# Patient Record
Sex: MALE | Race: WHITE | Employment: OTHER | URBAN - METROPOLITAN AREA
[De-identification: names, ages, dates, MRNs, and addresses within clinical notes are randomized per-mention and may not be internally consistent; named-entity substitution may affect disease eponyms.]

---

## 2017-01-23 ENCOUNTER — OFFICE VISIT (OUTPATIENT)
Dept: ORTHOPEDIC SURGERY | Age: 72
End: 2017-01-23

## 2017-01-23 VITALS
DIASTOLIC BLOOD PRESSURE: 81 MMHG | HEART RATE: 78 BPM | HEIGHT: 70 IN | BODY MASS INDEX: 29.35 KG/M2 | TEMPERATURE: 96 F | SYSTOLIC BLOOD PRESSURE: 133 MMHG | WEIGHT: 205 LBS

## 2017-01-23 DIAGNOSIS — M65.341 TRIGGER RING FINGER OF RIGHT HAND: Primary | ICD-10-CM

## 2017-01-23 RX ORDER — LIDOCAINE HYDROCHLORIDE 10 MG/ML
0.25 INJECTION INFILTRATION; PERINEURAL ONCE
Qty: 0.5 ML | Refills: 0
Start: 2017-01-23 | End: 2017-01-23

## 2017-01-23 RX ORDER — BETAMETHASONE SODIUM PHOSPHATE AND BETAMETHASONE ACETATE 3; 3 MG/ML; MG/ML
4.5 INJECTION, SUSPENSION INTRA-ARTICULAR; INTRALESIONAL; INTRAMUSCULAR; SOFT TISSUE ONCE
Qty: 1 VIAL | Refills: 0
Start: 2017-01-23 | End: 2017-01-23

## 2017-01-23 NOTE — PROGRESS NOTES
Patient: Albino Blankenship                MRN: [id number]       SSN: [id number]  YOB: 1945        AGE: 66 y.o. SEX: male  Body mass index is 29.41 kg/(m^2). PCP: Ko Barrera MD  01/23/17      Chief Complaint   Patient presents with    Hand Pain     RIGHT RING FINGER       HISTORY OF PRESENT ILLNESS: Albino Blankenship is a 66 y.o. male who presents to the office for right hand pain. Has noticed his finger catching recently. He exercises on a regular basis and contributes his symptoms to this. He is requesting an injection for his finger since they have worked in the past.      Review of Systems   Constitutional: Negative. HENT: Negative. Eyes: Negative. Respiratory: Negative. Cardiovascular: Negative. Gastrointestinal: Negative. Genitourinary: Negative. Musculoskeletal: Positive for myalgias (right hand). Skin: Negative. Neurological: Negative. Endo/Heme/Allergies: Negative. Psychiatric/Behavioral: Negative. Social History     Social History    Marital status:      Spouse name: N/A    Number of children: N/A    Years of education: N/A     Occupational History    Not on file. Social History Main Topics    Smoking status: Former Smoker    Smokeless tobacco: Not on file    Alcohol use No    Drug use: No    Sexual activity: Not on file     Other Topics Concern    Not on file     Social History Narrative        Past Medical History   Diagnosis Date    Arthritis     Cancer ([facility] [address]      lung    Chronic obstructive pulmonary disease (HCC)         No Known Allergies      Current Outpatient Prescriptions   Medication Sig    umeclidinium-vilanterol (ANORO ELLIPTA) 62.5-25 mcg/actuation inhaler 62.5 mcg.  simvastatin (ZOCOR) 20 mg tablet Take 20 mg by mouth nightly.  oxyCODONE-acetaminophen (PERCOCET) 5-325 mg per tablet Take 1-2 Tabs by mouth every four (4) hours as needed for Pain.     aspirin (ASPIRIN) 325 mg tablet Take 1 Tab by mouth two (2) times daily (after meals).  budesonide (PULMICORT FLEXHALER) 90 mcg/actuation aepb inhaler Take 1 Puff by inhalation two (2) times a day.  tiotropium (SPIRIVA WITH HANDIHALER) 18 mcg inhalation capsule Take 1 Cap by inhalation daily.  testosterone (ANDROGEL) 1 % (25 mg/2.5 g) glpk 25 mg by TransDERmal route daily. No current facility-administered medications for this visit. Physical Exam  Constitutional: he is oriented to person, place, and time and well-developed, well-nourished, and in no distress. No distress. HENT:   Head: Normocephalic and atraumatic. Right Ear: Hearing normal.   Left Ear: Hearing normal.   Nose: Nose normal.   Eyes: Conjunctivae, EOM and lids are normal. Pupils are equal, round, and reactive to light. Neck: Trachea normal.   Pulmonary/Chest: Effort normal and breath sounds normal. No respiratory distress. Abdominal: Soft. Neurological: he is alert and oriented to person, place, and time. Skin: Skin is warm, dry and intact. he is not diaphoretic. Psychiatric: Affect normal.   Nursing note and vitals reviewed. Ortho Exam  Right hand: TTP over the A1 pulley of thr ring finger. The pulling could be felt at the A1 pulley. Procedure: After timeout and under sterile conditions, patient's right ring finger was injected with . 25 cc of Xylocaine and .75 cc of Celestone.     [facility]  OFFICE PROCEDURE PROGRESS NOTE        Chart reviewed for the following:   Aniceto Altamirano MD, have reviewed the History, Physical and updated the Allergic reactions for [address] performed immediately prior to start of procedure:   Aniceto Altamirano MD, have performed the following reviews on [facility] prior to the start of the procedure:            * Patient was identified by name and date of birth   * Agreement on procedure being performed was verified  * Risks and Benefits explained to the patient  * Procedure site verified and marked as necessary  * Patient was positioned for comfort  * Consent was signed and verified     Time: 10:17 AM        Date of procedure: 1/23/2017    Procedure performed by: Holly Thakkar MD    Provider assisted by: None     Patient assisted by: self    How tolerated by patient: tolerated the procedure well with no complications    Comments: none    RADIOGRAPHS: No x-rays were taken. IMPRESSION & PLAN: I feel his right hand pain is due to his ring finger triggering. With the pt's permission, I injected his ringht ring finger with cortisone today. He was fitted with a night splint I will see him back prn.       Scribed by Dennie Rising ([address]) as dictated by Ben Arechiga MD.

## 2017-04-17 ENCOUNTER — OFFICE VISIT (OUTPATIENT)
Dept: ORTHOPEDIC SURGERY | Age: 72
End: 2017-04-17

## 2017-04-17 VITALS
HEART RATE: 75 BPM | WEIGHT: 208 LBS | HEIGHT: 70 IN | RESPIRATION RATE: 15 BRPM | SYSTOLIC BLOOD PRESSURE: 127 MMHG | TEMPERATURE: 97.7 F | DIASTOLIC BLOOD PRESSURE: 84 MMHG | BODY MASS INDEX: 29.78 KG/M2

## 2017-04-17 DIAGNOSIS — M65.332 TRIGGER MIDDLE FINGER OF LEFT HAND: Primary | ICD-10-CM

## 2017-04-17 RX ORDER — LIDOCAINE HYDROCHLORIDE 10 MG/ML
0.25 INJECTION INFILTRATION; PERINEURAL ONCE
Qty: 0.5 ML | Refills: 0
Start: 2017-04-17 | End: 2017-04-17

## 2017-04-17 RX ORDER — BETAMETHASONE SODIUM PHOSPHATE AND BETAMETHASONE ACETATE 3; 3 MG/ML; MG/ML
4.5 INJECTION, SUSPENSION INTRA-ARTICULAR; INTRALESIONAL; INTRAMUSCULAR; SOFT TISSUE ONCE
Qty: 1 VIAL | Refills: 0
Start: 2017-04-17 | End: 2017-04-17

## 2017-04-17 NOTE — PROGRESS NOTES
Patient: Surekha Weiss                MRN: [id number]       SSN: [id number]  YOB: 1945        AGE: 66 y.o. SEX: male  There is no height or weight on file to calculate BMI. PCP: Jakob Ray MD  04/17/17      No chief complaint on file. HISTORY OF PRESENT ILLNESS: Surekha Weiss is a 66 y.o. male who presents to the office for locking of the left long finger. He has had trouble with this on the right hand is requesting a cortisone injection of the left long finger. Review of Systems   Constitutional: Negative. HENT: Negative. Eyes: Negative. Respiratory: Negative. Cardiovascular: Negative. Gastrointestinal: Negative. Genitourinary: Negative. Musculoskeletal: Positive for myalgias (left hand). Skin: Negative. Neurological: Negative. Endo/Heme/Allergies: Negative. Psychiatric/Behavioral: Negative. Social History     Social History    Marital status:      Spouse name: N/A    Number of children: N/A    Years of education: N/A     Occupational History    Not on file. Social History Main Topics    Smoking status: Former Smoker    Smokeless tobacco: Not on file    Alcohol use No    Drug use: No    Sexual activity: Not on file     Other Topics Concern    Not on file     Social History Narrative        Past Medical History:   Diagnosis Date    Arthritis     Cancer ([facility] [address]     lung    Chronic obstructive pulmonary disease (HCC)         No Known Allergies      Current Outpatient Prescriptions   Medication Sig    umeclidinium-vilanterol (ANORO ELLIPTA) 62.5-25 mcg/actuation inhaler 62.5 mcg.  oxyCODONE-acetaminophen (PERCOCET) 5-325 mg per tablet Take 1-2 Tabs by mouth every four (4) hours as needed for Pain.  aspirin (ASPIRIN) 325 mg tablet Take 1 Tab by mouth two (2) times daily (after meals).  budesonide (PULMICORT FLEXHALER) 90 mcg/actuation aepb inhaler Take 1 Puff by inhalation two (2) times a day.     tiotropium (SPIRIVA WITH HANDIHALER) 18 mcg inhalation capsule Take 1 Cap by inhalation daily.  simvastatin (ZOCOR) 20 mg tablet Take 20 mg by mouth nightly.  testosterone (ANDROGEL) 1 % (25 mg/2.5 g) glpk 25 mg by TransDERmal route daily. No current facility-administered medications for this visit. Physical Exam  Constitutional: he is oriented to person, place, and time and well-developed, well-nourished, and in no distress. No distress. HENT:   Head: Normocephalic and atraumatic. Right Ear: Hearing normal.   Left Ear: Hearing normal.   Nose: Nose normal.   Eyes: Conjunctivae, EOM and lids are normal. Pupils are equal, round, and reactive to light. Neck: Trachea normal.   Pulmonary/Chest: Effort normal and breath sounds normal. No respiratory distress. Abdominal: Soft. Neurological: he is alert and oriented to person, place, and time. Skin: Skin is warm, dry and intact. he is not diaphoretic. Psychiatric: Affect normal.   Nursing note and vitals reviewed. Ortho Exam   Shows tenderness at the A1 pulley of the left long finger and locking at the PIP joint. Procedure: After timeout and under sterile conditions, patient's left middle finger was injected with 0.25 cc of Xylocaine and 0.75 cc of Celestone.     [address] [facility]  OFFICE PROCEDURE PROGRESS NOTE        Chart reviewed for the following:   Ruel Mattson MD, have reviewed the History, Physical and updated the Allergic reactions for [address] performed immediately prior to start of procedure:   Ruel Mattson MD, have performed the following reviews on [facility] prior to the start of the procedure:            * Patient was identified by name and date of birth   * Agreement on procedure being performed was verified  * Risks and Benefits explained to the patient  * Procedure site verified and marked as necessary  * Patient was positioned for comfort  * Consent was signed and verified     Time: 7:58 AM        Date of procedure: 4/17/2017    Procedure performed by: Darcy Gaucher, MD    Provider assisted by: None     Patient assisted by: self    How tolerated by patient: tolerated the procedure well with no complications    Comments: none      RADIOGRAPHS:  No new XR's taken today. IMPRESSION & PLAN: Trigger finger left long finger. Injection and night splint of the finger. Padding to prevent recurrence. I will see him back prn. Written by Adria De La Vega, as dictated by Dr. Hailey Castillo, Dr. Darcy Gaucher, confirm that all documentation is accurate.

## 2017-05-15 RX ORDER — BISMUTH SUBSALICYLATE 262 MG
1 TABLET,CHEWABLE ORAL DAILY
COMMUNITY

## 2017-05-25 ENCOUNTER — ANESTHESIA EVENT (OUTPATIENT)
Dept: ENDOSCOPY | Age: 72
End: 2017-05-25
Payer: MEDICARE

## 2017-05-26 ENCOUNTER — HOSPITAL ENCOUNTER (OUTPATIENT)
Age: 72
Setting detail: OUTPATIENT SURGERY
Discharge: HOME | End: 2017-05-26
Attending: INTERNAL MEDICINE | Admitting: INTERNAL MEDICINE
Payer: MEDICARE

## 2017-05-26 ENCOUNTER — ANESTHESIA (OUTPATIENT)
Dept: ENDOSCOPY | Age: 72
End: 2017-05-26
Payer: MEDICARE

## 2017-05-26 VITALS
SYSTOLIC BLOOD PRESSURE: 117 MMHG | RESPIRATION RATE: 16 BRPM | HEIGHT: 70 IN | BODY MASS INDEX: 29.2 KG/M2 | DIASTOLIC BLOOD PRESSURE: 73 MMHG | TEMPERATURE: 97.5 F | OXYGEN SATURATION: 98 % | WEIGHT: 204 LBS | HEART RATE: 74 BPM

## 2017-05-26 PROCEDURE — 76060000031 HC ANESTHESIA FIRST 0.5 HR: Performed by: INTERNAL MEDICINE

## 2017-05-26 PROCEDURE — 74011000250 HC RX REV CODE- 250

## 2017-05-26 PROCEDURE — 74011250636 HC RX REV CODE- 250/636

## 2017-05-26 PROCEDURE — 88305 TISSUE EXAM BY PATHOLOGIST: CPT | Performed by: INTERNAL MEDICINE

## 2017-05-26 PROCEDURE — 76040000019: Performed by: INTERNAL MEDICINE

## 2017-05-26 PROCEDURE — 74011000250 HC RX REV CODE- 250: Performed by: NURSE ANESTHETIST, CERTIFIED REGISTERED

## 2017-05-26 PROCEDURE — 74011250636 HC RX REV CODE- 250/636: Performed by: NURSE ANESTHETIST, CERTIFIED REGISTERED

## 2017-05-26 PROCEDURE — 77030013992 HC SNR POLYP ENDOSC BSC -B: Performed by: INTERNAL MEDICINE

## 2017-05-26 RX ORDER — FAMOTIDINE 10 MG/ML
20 INJECTION INTRAVENOUS ONCE
Status: COMPLETED | OUTPATIENT
Start: 2017-05-26 | End: 2017-05-26

## 2017-05-26 RX ORDER — INSULIN LISPRO 100 [IU]/ML
INJECTION, SOLUTION INTRAVENOUS; SUBCUTANEOUS ONCE
Status: DISCONTINUED | OUTPATIENT
Start: 2017-05-26 | End: 2017-05-26 | Stop reason: HOSPADM

## 2017-05-26 RX ORDER — ATROPINE SULFATE 0.1 MG/ML
0.5 INJECTION INTRAVENOUS
Status: CANCELLED | OUTPATIENT
Start: 2017-05-26 | End: 2017-05-26

## 2017-05-26 RX ORDER — NALOXONE HYDROCHLORIDE 0.4 MG/ML
0.4 INJECTION, SOLUTION INTRAMUSCULAR; INTRAVENOUS; SUBCUTANEOUS
Status: CANCELLED | OUTPATIENT
Start: 2017-05-26 | End: 2017-05-26

## 2017-05-26 RX ORDER — DEXTROMETHORPHAN/PSEUDOEPHED 2.5-7.5/.8
1.2 DROPS ORAL
Status: CANCELLED | OUTPATIENT
Start: 2017-05-26

## 2017-05-26 RX ORDER — SODIUM CHLORIDE 0.9 % (FLUSH) 0.9 %
5-10 SYRINGE (ML) INJECTION EVERY 8 HOURS
Status: CANCELLED | OUTPATIENT
Start: 2017-05-26 | End: 2017-05-26

## 2017-05-26 RX ORDER — FLUMAZENIL 0.1 MG/ML
0.2 INJECTION INTRAVENOUS
Status: CANCELLED | OUTPATIENT
Start: 2017-05-26 | End: 2017-05-26

## 2017-05-26 RX ORDER — EPINEPHRINE 0.1 MG/ML
1 INJECTION INTRACARDIAC; INTRAVENOUS
Status: CANCELLED | OUTPATIENT
Start: 2017-05-26 | End: 2017-05-26

## 2017-05-26 RX ORDER — LIDOCAINE HYDROCHLORIDE 20 MG/ML
INJECTION, SOLUTION EPIDURAL; INFILTRATION; INTRACAUDAL; PERINEURAL AS NEEDED
Status: DISCONTINUED | OUTPATIENT
Start: 2017-05-26 | End: 2017-05-26 | Stop reason: HOSPADM

## 2017-05-26 RX ORDER — LIDOCAINE HYDROCHLORIDE 10 MG/ML
0.1 INJECTION, SOLUTION EPIDURAL; INFILTRATION; INTRACAUDAL; PERINEURAL AS NEEDED
Status: DISCONTINUED | OUTPATIENT
Start: 2017-05-26 | End: 2017-05-26 | Stop reason: HOSPADM

## 2017-05-26 RX ORDER — SODIUM CHLORIDE 0.9 % (FLUSH) 0.9 %
5-10 SYRINGE (ML) INJECTION EVERY 8 HOURS
Status: DISCONTINUED | OUTPATIENT
Start: 2017-05-26 | End: 2017-05-26 | Stop reason: HOSPADM

## 2017-05-26 RX ORDER — SODIUM CHLORIDE 0.9 % (FLUSH) 0.9 %
5-10 SYRINGE (ML) INJECTION AS NEEDED
Status: CANCELLED | OUTPATIENT
Start: 2017-05-26 | End: 2017-05-26

## 2017-05-26 RX ORDER — SODIUM CHLORIDE, SODIUM LACTATE, POTASSIUM CHLORIDE, CALCIUM CHLORIDE 600; 310; 30; 20 MG/100ML; MG/100ML; MG/100ML; MG/100ML
75 INJECTION, SOLUTION INTRAVENOUS CONTINUOUS
Status: DISCONTINUED | OUTPATIENT
Start: 2017-05-26 | End: 2017-05-26 | Stop reason: HOSPADM

## 2017-05-26 RX ORDER — PROPOFOL 10 MG/ML
INJECTION, EMULSION INTRAVENOUS AS NEEDED
Status: DISCONTINUED | OUTPATIENT
Start: 2017-05-26 | End: 2017-05-26 | Stop reason: HOSPADM

## 2017-05-26 RX ORDER — AMOXICILLIN 500 MG/1
500 TABLET, FILM COATED ORAL
COMMUNITY
End: 2018-07-30

## 2017-05-26 RX ORDER — SODIUM CHLORIDE 0.9 % (FLUSH) 0.9 %
5-10 SYRINGE (ML) INJECTION AS NEEDED
Status: DISCONTINUED | OUTPATIENT
Start: 2017-05-26 | End: 2017-05-26 | Stop reason: HOSPADM

## 2017-05-26 RX ADMIN — LIDOCAINE HYDROCHLORIDE 60 MG: 20 INJECTION, SOLUTION EPIDURAL; INFILTRATION; INTRACAUDAL; PERINEURAL at 09:05

## 2017-05-26 RX ADMIN — PROPOFOL 50 MG: 10 INJECTION, EMULSION INTRAVENOUS at 09:05

## 2017-05-26 RX ADMIN — SODIUM CHLORIDE, SODIUM LACTATE, POTASSIUM CHLORIDE, AND CALCIUM CHLORIDE 75 ML/HR: 600; 310; 30; 20 INJECTION, SOLUTION INTRAVENOUS at 08:10

## 2017-05-26 RX ADMIN — PROPOFOL 50 MG: 10 INJECTION, EMULSION INTRAVENOUS at 09:10

## 2017-05-26 RX ADMIN — FAMOTIDINE 20 MG: 10 INJECTION, SOLUTION INTRAVENOUS at 08:10

## 2017-05-26 NOTE — DISCHARGE INSTRUCTIONS
Colon Polyps: Care Instructions  Your Care Instructions    Colon polyps are growths in the colon or the rectum. The cause of most colon polyps is not known, and most people who get them do not have any problems. But a certain kind can turn into cancer. For this reason, regular testing for colon polyps is important for people age 48 and older and anyone who has an increased risk for colon cancer. Polyps are usually found through routine colon cancer screening tests. Although most colon polyps are not cancerous, they are usually removed and then tested for cancer. Screening for colon cancer saves lives because the cancer can usually be cured if it is caught early. If you have a polyp that is the type that can turn into cancer, you may need more tests to examine your entire colon. The doctor will remove any other polyps that he or she finds, and you will be tested more often. Follow-up care is a key part of your treatment and safety. Be sure to make and go to all appointments, and call your doctor if you are having problems. It's also a good idea to know your test results and keep a list of the medicines you take. How can you care for yourself at home? Regular exams to look for colon polyps are the best way to prevent polyps from turning into colon cancer. These can include stool tests, sigmoidoscopy, colonoscopy, and CT colonography. Talk with your doctor about a testing schedule that is right for you. To prevent polyps  There is no home treatment that can prevent colon polyps. But these steps may help lower your risk for cancer. · Stay active. Being active can help you get to and stay at a healthy weight. Try to exercise on most days of the week. Walking is a good choice. · Eat well. Choose a variety of vegetables, fruits, legumes (such as peas and beans), fish, poultry, and whole grains. · Do not smoke. If you need help quitting, talk to your doctor about stop-smoking programs and medicines.  These can increase your chances of quitting for good. · If you drink alcohol, limit how much you drink. Limit alcohol to 2 drinks a day for men and 1 drink a day for women. When should you call for help? Call your doctor now or seek immediate medical care if:  · You have severe belly pain. · Your stools are maroon or very bloody. Watch closely for changes in your health, and be sure to contact your doctor if:  · You have a fever. · You have nausea or vomiting. · You have a change in bowel habits (new constipation or diarrhea). · Your symptoms get worse or are not improving as expected. Where can you learn more? Go to http://riya-caro.info/. Enter 95 [id number] in the search box to learn more about \"Colon Polyps: Care Instructions. \"  Current as of: August 24, 2016  Content Version: 11.2  © 9122-5546 [employer]. Care instructions adapted under license by [employer] (which disclaims liability or warranty for this information). If you have questions about a medical condition or this instruction, always ask your healthcare professional. [address] any warranty or liability for your use of this information. DISCHARGE SUMMARY from Nurse     POST-PROCEDURE NOTE:    Yasir Key diagnostic procedure was tolerated well. A copy of the study results will be sent to your Ordering Physician. Medications:    *Please give a list of your current medications to your Primary Care Provider. *Please update this list whenever your medications are discontinued, doses are  changed, or new medications (including over-the-counter products) are added. *Please carry medication information at all times in case of emergency situations. These are general instructions for a healthy lifestyle:    No smoking/ No tobacco products/ Avoid exposure to second hand smoke.  Surgeon General's Warning:  Quitting smoking now greatly reduces serious risk to your health.     Obesity, smoking, and a sedentary lifestyle greatly increase your risk for illness.  A healthy diet, regular physical exercise & weight monitoring are important for maintaining a healthy lifestyle   You may be retaining fluid if you have a history of heart failure or if you experience any of the following symptoms:  Weight gain of 3 pounds or more overnight or 5 pounds in a week, increased swelling in our hands or feet or shortness of breath while lying flat in bed. Please call your doctor as soon as you notice any of these symptoms; do not wait until your next office visit. Recognize signs and symptoms of STROKE:  F  -  Face looks uneven  A  -  Arms unable to move or move unevenly  S  -  Speech slurred or non-existent  T  -  Time to call 911 - as soon as signs and symptoms begin - DO NOT go back         to bed or wait to see If you get better - TIME IS BRAIN. Colorectal Screening   Colorectal cancer almost always develops from precancerous polyps (abnormal growths) in the colon or rectum. Screening tests can find precancerous polyps, so that they can be removed before they turn into cancer. Screening tests can also find colorectal cancer early, when treatment works best.  Myrna Hermosillo Speak with your physician about when you should begin screening and how often you should be tested. [employer] Activation    Thank you for requesting access to [employer]. Please follow the instructions below to securely access and download your online medical record. [employer] allows you to send messages to your doctor, view your test results, renew your prescriptions, schedule appointments, and more. How Do I Sign Up? 1. In your internet browser, go to [email or web address]. 2. Click on the First Time User? Click Here link in the Sign In box. You will see the New Member Sign Up page. 3. Enter your [employer] Access Code exactly as it appears below.  You will not need to use this code after youve completed the sign-up process. If you do not sign up before the expiration date, you must request a new code. [email or web address] Access Code: [email or web address]  Expires: 2017  8:28 AM (This is the date your [email or web address] access code will )    4. Enter the last four digits of your Social Security Number (xxxx) and Date of Birth (mm/dd/yyyy) as indicated and click Submit. You will be taken to the next sign-up page. 5. Create a [email or web address] ID. This will be your [email or web address] login ID and cannot be changed, so think of one that is secure and easy to remember. 6. Create a [email or web address] password. You can change your password at any time. 7. Enter your Password Reset Question and Answer. This can be used at a later time if you forget your password. 8. Enter your e-mail address. You will receive e-mail notification when new information is available in [address]. 9. Click Sign Up. You can now view and download portions of your medical record. 10. Click the Download Summary menu link to download a portable copy of your medical information. Additional Information    If you have questions, please call [phone]. Remember, [email or web address] is NOT to be used for urgent needs. For medical emergencies, dial 911. Educational references and/or instructions provided during this visit included:    Colon Polyps      A    Discharge information has been reviewed with the patient. The patient verbalized understanding. Colonoscopy: What to Expect at [address] [place] [address]  After you have a colonoscopy, you will stay at the clinic for 1 to 2 hours until the medicines wear off. Then you can go home. But you will need to arrange for a ride. Your doctor will tell you when you can eat and do your other usual activities. Your doctor will talk to you about when you will need your next colonoscopy. Your doctor can help you decide how often you need to be checked. This will depend on the results of your test and your risk for colorectal cancer.   After the test, you may be bloated or have gas pains. You may need to pass gas. If a biopsy was done or a polyp was removed, you may have streaks of blood in your stool (feces) for a few days. This care sheet gives you a general idea about how long it will take for you to recover. But each person recovers at a different pace. Follow the steps below to get better as quickly as possible. How can you care for yourself at home? Activity  · Rest when you feel tired. · You can do your normal activities when it feels okay to do so. Diet  · Follow your doctor's directions for eating. · Unless your doctor has told you not to, drink plenty of fluids. This helps to replace the fluids that were lost during the colon prep. · Do not drink alcohol. Medicines  · Your doctor will tell you if and when you can restart your medicines. He or she will also give you instructions about taking any new medicines. · If you take blood thinners, such as warfarin (Coumadin), clopidogrel (Plavix), or aspirin, be sure to talk to your doctor. He or she will tell you if and when to start taking those medicines again. Make sure that you understand exactly what your doctor wants you to do. · If polyps were removed or a biopsy was done during the test, your doctor may tell you not to take aspirin or other anti-inflammatory medicines for a few days. These include ibuprofen (Advil, Motrin) and naproxen (Aleve). Other instructions  · For your safety, do not drive or operate machinery until the medicine wears off and you can think clearly. Your doctor may tell you not to drive or operate machinery until the day after your test.  · Do not sign legal documents or make major decisions until the medicine wears off and you can think clearly. The anesthesia can make it hard for you to fully understand what you are agreeing to. Follow-up care is a key part of your treatment and safety.  Be sure to make and go to all appointments, and call your doctor if you are having problems. It's also a good idea to know your test results and keep a list of the medicines you take. When should you call for help? Call 911 anytime you think you may need emergency care. For example, call if:  · You passed out (lost consciousness). · You pass maroon or bloody stools. · You have severe belly pain. Call your doctor now or seek immediate medical care if:  · Your stools are black and tarlike. · Your stools have streaks of blood, but you did not have a biopsy or any polyps removed. · You have belly pain, or your belly is swollen and firm. · You vomit. · You have a fever. · You are very dizzy. Watch closely for changes in your health, and be sure to contact your doctor if you have any problems. Where can you learn more? Go to http://riya-caro.info/. Enter E264 in the search box to learn more about \"Colonoscopy: What to Expect at Home. \"  Current as of: August 9, 2016  Content Version: 11.2  © 2485-5956 [employer]. Care instructions adapted under license by [employer] (which disclaims liability or warranty for this information). If you have questions about a medical condition or this instruction, always ask your healthcare professional. [address] any warranty or liability for your use of this information.

## 2017-05-26 NOTE — PROCEDURES
Tiffanie  Two [facility], [address]      Brief Procedure Note    Abdirahman Agarwal  1945  [id number]    Date of Procedure: 5/26/2017    Preoperative diagnosis: V12.72 - Z86.010, Personal history of colonic polyps  496 - J44.9,  Mild chronic obstructive pulmonary disease  162.9 - C34.90,  Malignant tumor of lung    Postoperative diagnosis:  Rectal pollyp    Type of Anesthesia: MAC (monitered anesthesia care)    Description of Findings: same as post op dx    Procedure: Procedure(s):  COLONOSCOPY / POLYPECTOMY    :  Dr. Brandon Delgado MD    Assistant(s): [unfilled]    Type of Anesthesia:MAC     EBL:None    Specimens:   ID Type Source Tests Collected by Time Destination   1 : Rectal polyp Preservative Rectum  Brandon Delgado MD 5/26/2017 7240 Pathology       Findings: See printed and scanned procedure note    Complications: None    Dr. Brandon Delgado MD  5/26/2017  9:18 AM

## 2017-05-26 NOTE — PROCEDURES
Tiffanie  Two [facility], [address]      Brief Procedure Note    Venda Smoker  1945  [id number]    Date of Procedure: 5/26/2017    Preoperative diagnosis: V12.72 - Z86.010, Personal history of colonic polyps  496 - J44.9,  Mild chronic obstructive pulmonary disease  162.9 - C34.90,  Malignant tumor of lung    Postoperative diagnosis:  * normal colon exam. *    Type of Anesthesia: MAC (monitered anesthesia care)    Description of Findings: same as post op dx    Procedure: Procedure(s):  COLONOSCOPY    :  Dr. Leon Villaseñor MD    Assistant(s): [unfilled]    Type of Anesthesia:MAC     EBL:None    Specimens: * No specimens in log *    Findings: See printed and scanned procedure note    Complications: None    Dr. Leon Villaseñor MD  5/26/2017  8:52 AM

## 2017-05-26 NOTE — H&P
History and Physical reviewed; I have examined the patient and there are no pertinent changes. Darshana Moncada MD, MD   7:57 AM 5/26/2017  [facility], [address] [place] [address]  [email or web address]

## 2017-05-26 NOTE — ANESTHESIA POSTPROCEDURE EVALUATION
Post-Anesthesia Evaluation and Assessment    Patient: Darren Eng MRN: [id number]  SSN: [id number]    YOB: 1945  Age: 66 y.o. Sex: male       Cardiovascular Function/Vital Signs  Visit Vitals    BP (!) 80/50    Pulse 81    Temp 36.4 °C (97.5 °F)    Resp 20    Ht 5' 10\" (1.778 m)    Wt 92.5 kg (204 lb)    SpO2 96%    BMI 29.27 kg/m2       Patient is status post MAC anesthesia for Procedure(s):  COLONOSCOPY / POLYPECTOMY. Nausea/Vomiting: None    Postoperative hydration reviewed and adequate. Pain:  Pain Scale 1: Numeric (0 - 10) (05/26/17 6514)  Pain Intensity 1: 0 (05/26/17 0054)   Managed    Neurological Status: At baseline    Mental Status and Level of Consciousness: Arousable    Pulmonary Status:   O2 Device: Nasal cannula (05/26/17 0918)   Adequate oxygenation and airway patent    Complications related to anesthesia: None    Post-anesthesia assessment completed.  No concerns    Signed By: John Godinez MD     May 26, 2017

## 2017-05-26 NOTE — ANESTHESIA PREPROCEDURE EVALUATION
Anesthetic History   No history of anesthetic complications            Review of Systems / Medical History  Patient summary reviewed and pertinent labs reviewed    Pulmonary    COPD               Neuro/Psych   Within defined limits           Cardiovascular  Within defined limits                     GI/Hepatic/Renal  Within defined limits              Endo/Other        Arthritis     Other Findings   Comments:   Risk Factors for Postoperative nausea/vomiting:       History of postoperative nausea/vomiting? NO       Female? NO       Motion sickness? NO       Intended opioid administration for postoperative analgesia? NO      Smoking Abstinence  Current Smoker? NO  Elective Surgery? YES  Seen preoperatively by anesthesiologist or proxy prior to day of surgery? YES  Pt abstained from smoking 24 hours prior to anesthesia?  N/A           Physical Exam    Airway  Mallampati: II  TM Distance: 4 - 6 cm  Neck ROM: normal range of motion   Mouth opening: Normal     Cardiovascular  Regular rate and rhythm,  S1 and S2 normal,  no murmur, click, rub, or gallop             Dental  No notable dental hx       Pulmonary  Breath sounds clear to auscultation               Abdominal  Abdominal exam normal       Other Findings            Anesthetic Plan    ASA: 2  Anesthesia type: MAC          Induction: Intravenous  Anesthetic plan and risks discussed with: Patient

## 2017-05-26 NOTE — IP AVS SNAPSHOT
Beatriz Zambrano [address] [facility] [id number] [phone] Patient: Khoa Castano MRN: [id number] EXT:12/2/[birth year]3 You are allergic to the following No active allergies Recent Documentation Height Weight BMI Smoking Status 1.778 m 92.5 kg 29.27 kg/m2 Former Smoker Emergency Contacts Name Discharge Info Relation Home Work Mobile [address] CAREGIVER [3] Spouse [3] [phone] About your hospitalization You were admitted on:  May 26, 2017 You last received care in the:  [facility] You were discharged on:  May 26, 2017 Unit phone number:  [phone] Why you were hospitalized Your primary diagnosis was:  Not on File Providers Seen During Your Hospitalizations Provider Role Specialty Primary office phone Stevenson Luna MD Attending Provider Gastroenterology [phone] Your Primary Care Physician (PCP) Primary Care Physician Office Phone Office Fax Reyna Lyudmila [phone] Follow-up Information Follow up With Details Comments Contact Info MD Wolfgang Chan [phone] [facility] [address] 
[phone] Current Discharge Medication List  
  
CONTINUE these medications which have NOT CHANGED Dose & Instructions Dispensing Information Comments Morning Noon Evening Bedtime ADVIL PM PO Your last dose was: Your next dose is: Take  by mouth. Refills:  0  
     
   
   
   
  
 amoxicillin 500 mg Tab Your last dose was: Your next dose is:    
   
   
 Dose:  500 mg Take 500 mg by mouth once as needed (Prior to any procedure). Refills:  0 ANDROGEL 1 % (25 mg/2.5gram) Glpk Generic drug:  testosterone Your last dose was:     
   
Your next dose is:    
   
   
 Dose:  25 mg  
 25 mg by TransDERmal route daily. Refills:  0  
     
   
   
   
  
 ANORO ELLIPTA 62.5-25 mcg/actuation inhaler Generic drug:  umeclidinium-vilanterol Your last dose was: Your next dose is:    
   
   
 Dose:  62.5 mcg 62.5 mcg. Refills:  0  
     
   
   
   
  
 multivitamin tablet Commonly known as:  ONE A DAY Your last dose was: Your next dose is:    
   
   
 Dose:  1 Tab Take 1 Tab by mouth daily. Refills:  0  
     
   
   
   
  
 simvastatin 20 mg tablet Commonly known as:  ZOCOR Your last dose was: Your next dose is:    
   
   
 Dose:  20 mg Take 20 mg by mouth nightly. Refills:  0 Discharge Instructions Colon Polyps: Care Instructions Your Care Instructions Colon polyps are growths in the colon or the rectum. The cause of most colon polyps is not known, and most people who get them do not have any problems. But a certain kind can turn into cancer. For this reason, regular testing for colon polyps is important for people age 48 and older and anyone who has an increased risk for colon cancer. Polyps are usually found through routine colon cancer screening tests. Although most colon polyps are not cancerous, they are usually removed and then tested for cancer. Screening for colon cancer saves lives because the cancer can usually be cured if it is caught early. If you have a polyp that is the type that can turn into cancer, you may need more tests to examine your entire colon. The doctor will remove any other polyps that he or she finds, and you will be tested more often. Follow-up care is a key part of your treatment and safety. Be sure to make and go to all appointments, and call your doctor if you are having problems. It's also a good idea to know your test results and keep a list of the medicines you take. How can you care for yourself at home? Regular exams to look for colon polyps are the best way to prevent polyps from turning into colon cancer. These can include stool tests, sigmoidoscopy, colonoscopy, and CT colonography. Talk with your doctor about a testing schedule that is right for you. To prevent polyps There is no home treatment that can prevent colon polyps. But these steps may help lower your risk for cancer. · Stay active. Being active can help you get to and stay at a healthy weight. Try to exercise on most days of the week. Walking is a good choice. · Eat well. Choose a variety of vegetables, fruits, legumes (such as peas and beans), fish, poultry, and whole grains. · Do not smoke. If you need help quitting, talk to your doctor about stop-smoking programs and medicines. These can increase your chances of quitting for good. · If you drink alcohol, limit how much you drink. Limit alcohol to 2 drinks a day for men and 1 drink a day for women. When should you call for help? Call your doctor now or seek immediate medical care if: 
· You have severe belly pain. · Your stools are maroon or very bloody. Watch closely for changes in your health, and be sure to contact your doctor if: 
· You have a fever. · You have nausea or vomiting. · You have a change in bowel habits (new constipation or diarrhea). · Your symptoms get worse or are not improving as expected. Where can you learn more? Go to http://riya-caro.info/. Enter 95 [id number] in the search box to learn more about \"Colon Polyps: Care Instructions. \" Current as of: August 24, 2016 Content Version: 11.2 © 3354-1186 [employer]woon Edition[employer]. Care instructions adapted under license by [employer] (which disclaims liability or warranty for this information).  If you have questions about a medical condition or this instruction, always ask your healthcare professional. [address] any warranty or liability for your use of this information. DISCHARGE SUMMARY from Nurse POST-PROCEDURE NOTE: 
 
Todays diagnostic procedure was tolerated well. A copy of the study results will be sent to your Ordering Physician. Medications: *Please give a list of your current medications to your Primary Care Provider. *Please update this list whenever your medications are discontinued, doses are 
changed, or new medications (including over-the-counter products) are added. *Please carry medication information at all times in case of emergency situations. These are general instructions for a healthy lifestyle: No smoking/ No tobacco products/ Avoid exposure to second hand smoke. ? Surgeon General's Warning:  Quitting smoking now greatly reduces serious risk to your health. Obesity, smoking, and a sedentary lifestyle greatly increase your risk for illness. ? A healthy diet, regular physical exercise & weight monitoring are important for maintaining a healthy lifestyle ? You may be retaining fluid if you have a history of heart failure or if you experience any of the following symptoms:  Weight gain of 3 pounds or more overnight or 5 pounds in a week, increased swelling in our hands or feet or shortness of breath while lying flat in bed. Please call your doctor as soon as you notice any of these symptoms; do not wait until your next office visit. Recognize signs and symptoms of STROKE: 
F  -  Face looks uneven A  -  Arms unable to move or move unevenly S  -  Speech slurred or non-existent T  -  Time to call 911 - as soon as signs and symptoms begin - DO NOT go back         to bed or wait to see If you get better - TIME IS BRAIN. Colorectal Screening ? Colorectal cancer almost always develops from precancerous polyps (abnormal growths) in the colon or rectum. Screening tests can find precancerous polyps, so that they can be removed before they turn into cancer.  Screening tests can also find colorectal cancer early, when treatment works best. 
? Speak with your physician about when you should begin screening and how often you should be tested. MyChart Activation Thank you for requesting access to [employer]. Please follow the instructions below to securely access and download your online medical record. R[employer]ix allows you to send messages to your doctor, view your test results, renew your prescriptions, schedule appointments, and more. How Do I Sign Up? 1. In your internet browser, go to [email or web address]. 2. Click on the First Time User? Click Here link in the Sign In box. You will see the New Member Sign Up page. 3. Enter your Rormix Access Code exactly as it appears below. You will not need to use this code after youve completed the sign-up process. If you do not sign up before the expiration date, you must request a new code. R[employer]ix Access Code: [email or web address] Expires: 2017  8:28 AM (This is the date your [employer] access code will ) 4. Enter the last four digits of your Social Security Number (xxxx) and Date of Birth (mm/dd/yyyy) as indicated and click Submit. You will be taken to the next sign-up page. 5. Create a Rormix ID. This will be your R[employer]ix login ID and cannot be changed, so think of one that is secure and easy to remember. 6. Create a R[employer]ix password. You can change your password at any time. 7. Enter your Password Reset Question and Answer. This can be used at a later time if you forget your password. 8. Enter your e-mail address. You will receive e-mail notification when new information is available in [address] 9. Click Sign Up. You can now view and download portions of your medical record. 10. Click the Download Summary menu link to download a portable copy of your medical information. Additional Information If you have questions, please call [phone].  Remember, [address] is NOT to be used for urgent needs. For medical emergencies, dial 911. Educational references and/or instructions provided during this visit included: 
 
Colon Polyps A Discharge information has been reviewed with the patient. The patient verbalized understanding. Colonoscopy: What to Expect at [facility] Your Recovery After you have a colonoscopy, you will stay at the clinic for 1 to 2 hours until the medicines wear off. Then you can go home. But you will need to arrange for a ride. Your doctor will tell you when you can eat and do your other usual activities. Your doctor will talk to you about when you will need your next colonoscopy. Your doctor can help you decide how often you need to be checked. This will depend on the results of your test and your risk for colorectal cancer. After the test, you may be bloated or have gas pains. You may need to pass gas. If a biopsy was done or a polyp was removed, you may have streaks of blood in your stool (feces) for a few days. This care sheet gives you a general idea about how long it will take for you to recover. But each person recovers at a different pace. Follow the steps below to get better as quickly as possible. How can you care for yourself at home? Activity · Rest when you feel tired. · You can do your normal activities when it feels okay to do so. Diet · Follow your doctor's directions for eating. · Unless your doctor has told you not to, drink plenty of fluids. This helps to replace the fluids that were lost during the colon prep. · Do not drink alcohol. Medicines · Your doctor will tell you if and when you can restart your medicines. He or she will also give you instructions about taking any new medicines. · If you take blood thinners, such as warfarin (Coumadin), clopidogrel (Plavix), or aspirin, be sure to talk to your doctor. He or she will tell you if and when to start taking those medicines again.  Make sure that you understand exactly what your doctor wants you to do. · If polyps were removed or a biopsy was done during the test, your doctor may tell you not to take aspirin or other anti-inflammatory medicines for a few days. These include ibuprofen (Advil, Motrin) and naproxen (Aleve). Other instructions · For your safety, do not drive or operate machinery until the medicine wears off and you can think clearly. Your doctor may tell you not to drive or operate machinery until the day after your test. 
· Do not sign legal documents or make major decisions until the medicine wears off and you can think clearly. The anesthesia can make it hard for you to fully understand what you are agreeing to. Follow-up care is a key part of your treatment and safety. Be sure to make and go to all appointments, and call your doctor if you are having problems. It's also a good idea to know your test results and keep a list of the medicines you take. When should you call for help? Call 911 anytime you think you may need emergency care. For example, call if: 
· You passed out (lost consciousness). · You pass maroon or bloody stools. · You have severe belly pain. Call your doctor now or seek immediate medical care if: 
· Your stools are black and tarlike. · Your stools have streaks of blood, but you did not have a biopsy or any polyps removed. · You have belly pain, or your belly is swollen and firm. · You vomit. · You have a fever. · You are very dizzy. Watch closely for changes in your health, and be sure to contact your doctor if you have any problems. Where can you learn more? Go to http://riya-caro.info/. Enter E264 in the search box to learn more about \"Colonoscopy: What to Expect at Home. \" Current as of: August 9, 2016 Content Version: 11.2 © 6327-3120 [employer]corn[employer] Inc[employer].  Care instructions adapted under license by [employer] (which disclaims liability or warranty for this information). If you have questions about a medical condition or this instruction, always ask your healthcare professional. [address] any warranty or liability for your use of this information. Discharge Orders None Introducing [facility]! [employer] introduces [employer] patient portal. Now you can access parts of your medical record, email your doctor's office, and request medication refills online. 1. In your internet browser, go to [email or web address] 2. Click on the First Time User? Click Here link in the Sign In box. You will see the New Member Sign Up page. 3. Enter your Hitmeister Access Code exactly as it appears below. You will not need to use this code after youve completed the sign-up process. If you do not sign up before the expiration date, you must request a new code. · Hitmeister Access Code: [email or web address] Expires: 7/16/2017  8:28 AM 
 
4. Enter the last four digits of your Social Security Number (xxxx) and Date of Birth (mm/dd/yyyy) as indicated and click Submit. You will be taken to the next sign-up page. 5. Create a Hitmeister ID. This will be your Hitmeister login ID and cannot be changed, so think of one that is secure and easy to remember. 6. Create a Hitmeister password. You can change your password at any time. 7. Enter your Password Reset Question and Answer. This can be used at a later time if you forget your password. 8. Enter your e-mail address. You will receive e-mail notification when new information is available in [address] 9. Click Sign Up. You can now view and download portions of your medical record. 10. Click the Download Summary menu link to download a portable copy of your medical information. If you have questions, please visit the Frequently Asked Questions section of the Hitmeister website. Remember, [employer]ister is NOT to be used for urgent needs. For medical emergencies, dial 911. Now available from your iPhone and Android! General Information Please provide this summary of care documentation to your next provider. Patient Signature:  ____________________________________________________________ Date:  ____________________________________________________________  
  
Ghulam Pedro Provider Signature:  ____________________________________________________________ Date:  ____________________________________________________________

## 2017-09-11 ENCOUNTER — OFFICE VISIT (OUTPATIENT)
Dept: ORTHOPEDIC SURGERY | Age: 72
End: 2017-09-11

## 2017-09-11 VITALS
OXYGEN SATURATION: 94 % | HEART RATE: 81 BPM | HEIGHT: 70 IN | WEIGHT: 210 LBS | SYSTOLIC BLOOD PRESSURE: 121 MMHG | TEMPERATURE: 98.3 F | BODY MASS INDEX: 30.06 KG/M2 | DIASTOLIC BLOOD PRESSURE: 89 MMHG

## 2017-09-11 DIAGNOSIS — M65.332 TRIGGER MIDDLE FINGER OF LEFT HAND: Primary | ICD-10-CM

## 2017-09-11 DIAGNOSIS — M65.331 TRIGGER MIDDLE FINGER OF RIGHT HAND: ICD-10-CM

## 2017-09-11 DIAGNOSIS — M65.341 TRIGGER RING FINGER OF RIGHT HAND: ICD-10-CM

## 2017-09-11 RX ORDER — TRIAMCINOLONE ACETONIDE 40 MG/ML
80 INJECTION, SUSPENSION INTRA-ARTICULAR; INTRAMUSCULAR ONCE
Qty: 2 ML | Refills: 0 | Status: CANCELLED
Start: 2017-09-11 | End: 2017-09-11

## 2017-09-11 RX ORDER — TRIAMCINOLONE ACETONIDE 40 MG/ML
40 INJECTION, SUSPENSION INTRA-ARTICULAR; INTRAMUSCULAR ONCE
Qty: 1 ML | Refills: 0
Start: 2017-09-11 | End: 2017-09-13

## 2017-09-11 RX ORDER — LIDOCAINE HYDROCHLORIDE 10 MG/ML
0.5 INJECTION INFILTRATION; PERINEURAL ONCE
Qty: 0.5 ML | Refills: 0
Start: 2017-09-11 | End: 2017-09-13

## 2017-09-11 RX ORDER — LIDOCAINE HYDROCHLORIDE 10 MG/ML
6 INJECTION INFILTRATION; PERINEURAL ONCE
Qty: 6 ML | Refills: 0 | Status: CANCELLED
Start: 2017-09-11 | End: 2017-09-11

## 2017-09-11 NOTE — PATIENT INSTRUCTIONS
Trigger Finger: Care Instructions  Your Care Instructions  A trigger finger is a finger stuck in a bent position. The bent finger usually straightens out on its own. A trigger finger can be painful, but it normally is not a serious problem. Trigger fingers seem to occur more in some groups of people. These include people who have diabetes or arthritis or who have injured their hands in the past. This problem also occurs in musicians and people who  tools often. Rest, exercises, and other things you can do at home may help your trigger finger relax so that it can bend as it should. You may get a corticosteroid shot. This can reduce swelling and pain. Your doctor may put a splint on your finger. This will give your finger some rest and avoid irritating the joint. You may need surgery if the finger keeps locking in a bent position. Follow-up care is a key part of your treatment and safety. Be sure to make and go to all appointments, and call your doctor if you are having problems. It's also a good idea to know your test results and keep a list of the medicines you take. How can you care for yourself at home? · If your doctor put a splint on your finger, wear the splint as directed. Do not remove it until your doctor says you can. · You may need to change your activities to avoid movements that irritate the finger. · If your finger is swollen, put ice or a cold pack on your finger for 10 to 20 minutes at a time. Try to do this every 1 to 2 hours for the next 3 days (when you are awake) or until the swelling goes down. Put a thin cloth between the ice and your skin. · Prop up your hand on a pillow when you ice it or anytime you sit or lie down during the next 3 days. Try to keep it above the level of your heart. This will help reduce swelling. · Take your medicines exactly as prescribed. Call your doctor if you think you are having a problem with your medicine.   · Ask your doctor if you can take an over-the-counter pain medicine, such as acetaminophen (Tylenol), ibuprofen (Advil, Motrin), or naproxen (Aleve). Be safe with medicines. Read and follow all instructions on the label. · If your doctor recommends exercises, do them as directed. When should you call for help? Call your doctor now or seek immediate medical care if:  · Your finger locks in a bent position and will not straighten. Watch closely for changes in your health, and be sure to contact your doctor if:  · You do not get better as expected. Where can you learn more? Go to http://riya-caro.info/. Enter M826 in the search box to learn more about \"Trigger Finger: Care Instructions. \"  Current as of: March 21, 2017  Content Version: 11.3  © 4537-1158 Videoflow. Care instructions adapted under license by [employer] (which disclaims liability or warranty for this information). If you have questions about a medical condition or this instruction, always ask your healthcare professional. [address] any warranty or liability for your use of this information.

## 2017-09-11 NOTE — PROGRESS NOTES
Nicki Tovar  1945   Chief Complaint   Patient presents with    Hand Pain     BILAT        HISTORY OF PRESENT ILLNESS  Nicki Tovar is a 66 y.o. male who presents today for evaluation of B/L hand pain, left long finger and right ring finger. he rates his pain 6/10 today. Patient treated in the past by Dr. Maurizio Belcher with cortisone injections. He reports having catching of the left long and right ring fingers. He is requesting cortisone injections today as these have worked well for him in the past.    No Known Allergies     Past Medical History:   Diagnosis Date    Arthritis     Cancer ([facility] [address] 2012    lung    Chronic obstructive pulmonary disease ([facility] [address]       Social History     Social History    Marital status:      Spouse name: N/A    Number of children: N/A    Years of education: N/A     Occupational History    Not on file. Social History Main Topics    Smoking status: Former Smoker    Smokeless tobacco: Never Used    Alcohol use No    Drug use: No    Sexual activity: Not on file     Other Topics Concern    Not on file     Social History Narrative      Past Surgical History:   Procedure Laterality Date    COLONOSCOPY N/A 5/26/2017    COLONOSCOPY / POLYPECTOMY performed by Radha Beltran MD at [facility] ENDOSCOPY    HX APPENDECTOMY      HX ORTHOPAEDIC      left thumb tendon repair    HX ORTHOPAEDIC      right knee arthroscopy    HX OTHER SURGICAL  2012    Right Upper Lobectomy      History reviewed. No pertinent family history. Current Outpatient Prescriptions   Medication Sig    triamcinolone acetonide (KENALOG) 40 mg/mL injection 1 mL by IntraMUSCular route once for 1 dose.  lidocaine (XYLOCAINE) 10 mg/mL (1 %) injection 0.5 mL by Intra artICUlar route once for 1 dose.  multivitamin (ONE A DAY) tablet Take 1 Tab by mouth daily.  IBUPROFEN/DIPHENHYDRAMINE CIT (ADVIL PM PO) Take  by mouth.     umeclidinium-vilanterol (ANORO ELLIPTA) 62.5-25 mcg/actuation inhaler 62.5 mcg.  simvastatin (ZOCOR) 20 mg tablet Take 20 mg by mouth nightly.  testosterone (ANDROGEL) 1 % (25 mg/2.5 g) glpk 25 mg by TransDERmal route daily.  amoxicillin 500 mg tab Take 500 mg by mouth once as needed (Prior to any procedure). No current facility-administered medications for this visit. REVIEW OF SYSTEM   Patient denies: Weight loss, Fever/Chills, HA, Visual changes, Fatigue, Chest pain, SOB, Abdominal pain, N/V/D/C, Blood in stool or urine, Edema. Pertinent positive as above in HPI. All others were negative    PHYSICAL EXAM:   Visit Vitals    /89    Pulse 81    Temp 98.3 °F (36.8 °C) (Oral)    Ht 5' 10\" (1.778 m)    Wt 210 lb (95.3 kg)    SpO2 94%    BMI 30.13 kg/m2     The patient is a well-developed, well-nourished male   in no acute distress. The patient is alert and oriented times three. The patient is alert and oriented times three. Mood and affect are normal.  LYMPHATIC: lymph nodes are not enlarged and are within normal limits  SKIN: normal in color and non tender to palpation. There are no bruises or abrasions noted. NEUROLOGICAL: Motor sensory exam is within normal limits. Reflexes are equal bilaterally.  There is normal sensation to pinprick and light touch  MUSCULOSKELETAL:  Examination Left Hand Right Hand   Skin Intact Intact   Deformity - -   Swelling - -   Tenderness - -   Tenderness A1 Pulley ring long   Finger flexion Full Full   Finger extension Full Full   Thenar Eminence Atrophy - -   Sensation Normal Normal   Capillary refill - -   Heberden's nodes - -   Dupuytren's - -     Examination Left Wrist Right Wrist   Skin Intact Intact   Tenderness - -   Flexion 60 60   Extension 60 60   Deformity - -   Effusion - -   Tinnel's sign - -   Phalen's test - -   Finklestein maneuver - -   Pain with thumb abduction - -          PROCEDURE: After sterile prep, 0.5 cc of Xylocaine and 0.5 cc of Kenalog were injected into the A1 pulley fo the left long finger and right ring finger. [address] [facility]  OFFICE PROCEDURE PROGRESS NOTE        Chart reviewed for the following:  Elizabeth Sims MD, have reviewed the History, Physical and updated the Allergic reactions for Montez Montejo 1160 performed immediately prior to start of procedure:  Elizabeth Sims MD, have performed the following reviews on Layman Cancel prior to the start of the procedure:            * Patient was identified by name and date of birth   * Agreement on procedure being performed was verified  * Risks and Benefits explained to the patient  * Procedure site verified and marked as necessary  * Patient was positioned for comfort  * Consent was signed and verified     Time: 11:55 AM    Date of procedure: 9/11/2017    Procedure performed by:  Christopher Akhtar MD    Provider assisted by: (see medication administration)    How tolerated by patient: tolerated the procedure well with no complications    Comments: none      IMPRESSION:      ICD-10-CM ICD-9-CM    1. Trigger middle finger of left hand M65.332 727.03 triamcinolone acetonide (KENALOG) 40 mg/mL injection      lidocaine (XYLOCAINE) 10 mg/mL (1 %) injection      LA INJECT TENDON SHEATH/LIGAMENT   2. Trigger middle finger of right hand M65.331 727.03 triamcinolone acetonide (KENALOG) 40 mg/mL injection      lidocaine (XYLOCAINE) 10 mg/mL (1 %) injection      LA INJECT TENDON SHEATH/LIGAMENT   3. Trigger ring finger of right hand M65.341 727.03         PLAN:  1. Patient experiencing triggering of both the left long finger and right ring finger. Risk factors include: n/a  2. Yes cortisone injection indicated today: LEFT LONG AND RIGHT RING FINGERS  3. No Physical/Occupational Therapy indicated today  4. No diagnostic test indicated today  5. No durable medical equipment indicated today  6. No referral indicated today   7. No medications indicated today  8.  No Narcotic indicated today       RTC PRN  Follow-up Disposition: Not on File    Scribed by Joselo Moser [facility]) as dictated by MD FARHAN Yoo, DrEmeli Harman, confirm that all documentation is accurate.     Nakia Harman M.D.   [facility]

## 2017-09-13 RX ORDER — TRIAMCINOLONE ACETONIDE 40 MG/ML
40 INJECTION, SUSPENSION INTRA-ARTICULAR; INTRAMUSCULAR ONCE
Qty: 0.5 ML | Refills: 0
Start: 2017-09-13 | End: 2017-09-13

## 2018-07-30 ENCOUNTER — OFFICE VISIT (OUTPATIENT)
Dept: ORTHOPEDIC SURGERY | Age: 73
End: 2018-07-30

## 2018-07-30 VITALS
TEMPERATURE: 96.8 F | RESPIRATION RATE: 16 BRPM | HEART RATE: 93 BPM | WEIGHT: 210 LBS | BODY MASS INDEX: 30.06 KG/M2 | OXYGEN SATURATION: 95 % | DIASTOLIC BLOOD PRESSURE: 74 MMHG | HEIGHT: 70 IN | SYSTOLIC BLOOD PRESSURE: 142 MMHG

## 2018-07-30 DIAGNOSIS — M65.332 TRIGGER MIDDLE FINGER OF LEFT HAND: Primary | ICD-10-CM

## 2018-07-30 DIAGNOSIS — M65.341 TRIGGER RING FINGER OF RIGHT HAND: ICD-10-CM

## 2018-07-30 RX ORDER — TRIAMCINOLONE ACETONIDE 40 MG/ML
20 INJECTION, SUSPENSION INTRA-ARTICULAR; INTRAMUSCULAR ONCE
Qty: 0.5 ML | Refills: 0
Start: 2018-07-30 | End: 2018-07-30

## 2018-07-30 NOTE — PROGRESS NOTES
Annette Valencia 1945 Chief Complaint Patient presents with  
 Hand Pain LEFT HAND F/U  
  
 
HISTORY OF PRESENT ILLNESS nAnette Valencia is a 74 y.o. male who presents today for reevaluation of left hand follow up. Patient rates pain as 5/10 today. At last OV, patient had a cortisone injection which provided good relief. Patient is requesting an injection today due to returning pain. Patient denies any fever, chills, chest pain, shortness of breath or calf pain. There are no new illness or injuries to report since last seen in the office. There are no changes to medications, allergies, family or social history. PHYSICAL EXAM:  
Visit Vitals  /74  Pulse 93  Temp 96.8 °F (36 °C) (Oral)  Resp 16  
 Ht 5' 10\" (1.778 m)  Wt 210 lb (95.3 kg)  SpO2 95%  BMI 30.13 kg/m2 The patient is a well-developed, well-nourished male   in no acute distress. The patient is alert and oriented times three. The patient is alert and oriented times three. Mood and affect are normal. 
LYMPHATIC: lymph nodes are not enlarged and are within normal limits SKIN: normal in color and non tender to palpation. There are no bruises or abrasions noted. NEUROLOGICAL: Motor sensory exam is within normal limits. Reflexes are equal bilaterally. There is normal sensation to pinprick and light touch MUSCULOSKELETAL: 
Examination Left Hand Right Hand Skin Intact Intact Deformity - - Swelling - - Tenderness - - Tenderness A1 Pulley ring long Finger flexion Full Full Finger extension Full Full Thenar Eminence Atrophy - - Sensation Normal Normal  
Capillary refill - -  
Heberden's nodes - -  
Dupuytren's - -  
  
Examination Left Wrist Right Wrist  
Skin Intact Intact Tenderness - - Flexion 60 60 Extension 60 60 Deformity - - Effusion - - Tinnel's sign - - Phalen's test - - Finklestein maneuver - -  
Pain with thumb abduction - - PROCEDURE: A1 pulley of left middle finger Injection with Ultrasound Guidance After sterile prep, 0.5 cc of Xylocaine and 0.5 cc of Kenalog were injected into the left A1 pulley of the middle finger. Ultrasound images captured using [address] [place] [address] Ultrasound machine and scanned into patient's chart. [facility] 
OFFICE PROCEDURE PROGRESS NOTE Chart reviewed for the following: 
Elena Leal M.D, have reviewed the History, Physical and updated the Allergic reactions for Sofia Velázquez TIME OUT performed immediately prior to start of procedure: 
I, Blade Reyna M.D, have performed the following reviews on Sofia Velázquez prior to the start of the procedure: 
         
* Patient was identified by name and date of birth * Agreement on procedure being performed was verified * Risks and Benefits explained to the patient * Procedure site verified and marked as necessary * Patient was positioned for comfort * Consent was signed and verified Time: 1:50 PM  
 
Date of procedure: 7/30/2018 Procedure performed by:  Blade Reyna M.D Provider assisted by: (see medication administration) How tolerated by patient: tolerated the procedure well with no complications Comments: none IMPRESSION:   
  ICD-10-CM ICD-9-CM 1. Trigger middle finger of left hand M65.332 727.03 TRIAMCINOLONE ACETONIDE INJ  
   triamcinolone acetonide (KENALOG) 40 mg/mL injection US GUIDE INJ/ASP/ARTHRO SM JNT/BURSA 2. Trigger ring finger of right hand M65.341 727.03 PLAN:  
1. Patient presents to day with reoccurring left middle finger pain and I am hopeful an injection today will help. Risk factors include: n/a 2. No ultrasound exam indicated today 3. Yes cortisone injection indicated today L A1 PULLEY MIDDLE FINGER US 
4. No Physical/Occupational Therapy indicated today 5. No diagnostic test indicated today: 6.  No durable medical equipment indicated today 7. No referral indicated today 8. No medications indicated today: 9. No Narcotic indicated today RTC prn Follow-up Disposition: Not on File Scribed by Alo Atwood ([address] [place] [address] [address]) as dictated by Surya Spann MD 
 
I, Dr. Surya Spann, confirm that all documentation is accurate.  
 
Surya Spann M.D.  
[facility]

## 2019-01-31 ENCOUNTER — OFFICE VISIT (OUTPATIENT)
Dept: ORTHOPEDIC SURGERY | Age: 74
End: 2019-01-31

## 2019-01-31 VITALS
OXYGEN SATURATION: 92 % | RESPIRATION RATE: 16 BRPM | WEIGHT: 209.2 LBS | SYSTOLIC BLOOD PRESSURE: 106 MMHG | HEART RATE: 88 BPM | BODY MASS INDEX: 29.95 KG/M2 | DIASTOLIC BLOOD PRESSURE: 67 MMHG | HEIGHT: 70 IN | TEMPERATURE: 97.4 F

## 2019-01-31 DIAGNOSIS — M75.52 SUBACROMIAL BURSITIS OF LEFT SHOULDER JOINT: Primary | ICD-10-CM

## 2019-01-31 RX ORDER — TRIAMCINOLONE ACETONIDE 40 MG/ML
40 INJECTION, SUSPENSION INTRA-ARTICULAR; INTRAMUSCULAR ONCE
Qty: 1 ML | Refills: 0
Start: 2019-01-31 | End: 2019-01-31

## 2019-01-31 NOTE — PROGRESS NOTES
Maryam Mercado 1945 Chief Complaint Patient presents with  Shoulder Pain Left shoulder pain follow up HISTORY OF PRESENT ILLNESS Maryam Mercado is a 74 y.o. male who presents today for evaluation of left shoulder pain. Patient rates pain as 1/10 today. The patient states he has pain at night time, which improves when he lifts his arm overhead. He locates the pain in the biceps region of the arm. No pain with movements, but pain at night. He does do a lot of overhead work. Patient denies any fever, chills, chest pain, shortness of breath or calf pain. The remainder of the review of systems is negative. There are no new illness or injuries to report since last seen in the office. There are no changes to medications, allergies, family or social history. PHYSICAL EXAM:  
Visit Vitals /67 Pulse 88 Temp 97.4 °F (36.3 °C) (Oral) Resp 16 Ht 5' 10\" (1.778 m) Wt 209 lb 3.2 oz (94.9 kg) SpO2 92% BMI 30.02 kg/m² The patient is a well-developed, well-nourished male   in no acute distress. The patient is alert and oriented times three. The patient is alert and oriented times three. Mood and affect are normal. 
LYMPHATIC: lymph nodes are not enlarged and are within normal limits SKIN: normal in color and non tender to palpation. There are no bruises or abrasions noted. NEUROLOGICAL: Motor sensory exam is within normal limits. Reflexes are equal bilaterally. There is normal sensation to pinprick and light touch MUSCULOSKELETAL: 
Examination Left shoulder Skin Intact AC joint tenderness - Biceps tenderness - Forward flexion/Elevation  Active abduction  Glenohumeral abduction 90 External rotation ROM 30 Internal rotation ROM 30 Apprehension -  
Karines Relocation -  
Jerk - Load and Shift -  
Sveta Newell - Natty - Impingement sign + Supraspinatus/Empty Can + External Rotation Strength -, 5/5 Lift Off/Belly Press -, 5/5  
 Neurovascular Intact PROCEDURE: Left Shoulder Injection with Ultrasound Guidance Indication:Left Shoulder pain/swelling After sterile prep, 6 cc of Xylocaine and 1 cc of Kenalog were injected into the left shoulder. Ultrasound images captured using [address] [place] [address] Ultrasound machine and scanned into patient's chart. [address] [facility] 
OFFICE PROCEDURE PROGRESS NOTE Chart reviewed for the following: 
Fremont Schwab, M.D, have reviewed the History, Physical and updated the Allergic reactions for Charline Dandy TIME OUT performed immediately prior to start of procedure: 
I, Terence Lebron M.D, have performed the following reviews on Charline Dandy prior to the start of the procedure: 
         
* Patient was identified by name and date of birth * Agreement on procedure being performed was verified * Risks and Benefits explained to the patient * Procedure site verified and marked as necessary * Patient was positioned for comfort * Consent was signed and verified Time: 11:11 AM  
 
Date of procedure: 1/31/2019 Procedure performed by:  Terence Lebron M.D Provider assisted by: (see medication administration) How tolerated by patient: tolerated the procedure well with no complications Comments: none IMAGING: XR of left shoulder dated 12/8/18 was reviewed and read: IMPRESSION: 
No acute osseous abnormality IMPRESSION:   
  ICD-10-CM ICD-9-CM 1. Subacromial bursitis of left shoulder joint M75.52 726.19 TRIAMCINOLONE ACETONIDE INJ  
   triamcinolone acetonide (KENALOG) 40 mg/mL injection US GUIDE INJ/ASP/ARTHRO LG JNT/BURSA PLAN:  
1. The patient presents today with left shoulder pain and I would like to try an injection today to differentiate the pain from the neck. Risk factors include: n/a 2. No ultrasound exam indicated today 3.  Yes cortisone injection indicated today L SHOULDER US 
 4. No Physical/Occupational Therapy indicated today 5. No diagnostic test indicated today: 6. No durable medical equipment indicated today 7. No referral indicated today 8. No medications indicated today: 9. No Narcotic indicated today RTC 3 weeks if pain continues Follow-up Disposition: Not on File Scribed by Lashawn Cantu ([address] [place] [address] [address]) as dictated by MD FARHAN Jefferson, Dr. Julian Muñoz, confirm that all documentation is accurate.  
 
Julian Muñoz M.D.  
[facility]

## 2019-07-24 ENCOUNTER — OFFICE VISIT (OUTPATIENT)
Dept: ORTHOPEDIC SURGERY | Age: 74
End: 2019-07-24

## 2019-07-24 VITALS
WEIGHT: 213.2 LBS | HEIGHT: 70 IN | HEART RATE: 89 BPM | BODY MASS INDEX: 30.52 KG/M2 | SYSTOLIC BLOOD PRESSURE: 117 MMHG | OXYGEN SATURATION: 93 % | TEMPERATURE: 98.1 F | RESPIRATION RATE: 17 BRPM | DIASTOLIC BLOOD PRESSURE: 81 MMHG

## 2019-07-24 DIAGNOSIS — M75.52 SUBACROMIAL BURSITIS OF LEFT SHOULDER JOINT: Primary | ICD-10-CM

## 2019-07-24 RX ORDER — TRIAMCINOLONE ACETONIDE 40 MG/ML
40 INJECTION, SUSPENSION INTRA-ARTICULAR; INTRAMUSCULAR ONCE
Qty: 1 ML | Refills: 0
Start: 2019-07-24 | End: 2019-07-24

## 2019-07-24 NOTE — PROGRESS NOTES
1. Have you been to the ER, urgent care clinic since your last visit? Hospitalized since your last visit? No    2. Have you seen or consulted any other health care providers outside of the [address] [place] [address] since your last visit? Include any pap smears or colon screening.  No

## 2019-07-24 NOTE — PROGRESS NOTES
Earle Rodríguez  1945   Chief Complaint   Patient presents with    Shoulder Pain     Left        HISTORY OF PRESENT ILLNESS  Earle Rodríguez is a 74 y.o. male who presents today for evaluation of left shoulder pain. Patient rates pain as 6/10 today. The patient states he has pain at night time, which improves when he lifts his arm overhead. Pt describes pain as burning. He locates the pain in the biceps region of the arm. No pain with movements, but pain at night. He does do a lot of overhead work. At last OV on 1/31/19, patient had a cortisone injection which provided good relief but the pain has returned within the last few months. Patient denies any fever, chills, chest pain, shortness of breath or calf pain. The remainder of the review of systems is negative. There are no new illness or injuries to report since last seen in the office. There are no changes to medications, allergies, family or social history. Pain Assessment  7/24/2019   Location of Pain Shoulder   Location Modifiers Left   Severity of Pain 6   Quality of Pain Burning   Duration of Pain Persistent   Frequency of Pain Constant   Aggravating Factors Bending;Stretching;Straightening   Limiting Behavior -   Relieving Factors (No Data)   Relieving Factors Comment Cortisone inj   Result of Injury -     PHYSICAL EXAM:   Visit Vitals  /81 (BP 1 Location: Right arm, BP Patient Position: Sitting)   Pulse 89   Temp 98.1 °F (36.7 °C) (Oral)   Resp 17   Ht 5' 10\" (1.778 m)   Wt 213 lb 3.2 oz (96.7 kg)   SpO2 93%   BMI 30.59 kg/m²     The patient is a well-developed, well-nourished male   in no acute distress. The patient is alert and oriented times three. The patient is alert and oriented times three. Mood and affect are normal.  LYMPHATIC: lymph nodes are not enlarged and are within normal limits  SKIN: normal in color and non tender to palpation. There are no bruises or abrasions noted.    NEUROLOGICAL: Motor sensory exam is within normal limits. Reflexes are equal bilaterally. There is normal sensation to pinprick and light touch  MUSCULOSKELETAL:  Examination Left shoulder   Skin Intact   AC joint tenderness -   Biceps tenderness -   Forward flexion/Elevation    Active abduction    Glenohumeral abduction 90   External rotation ROM 30   Internal rotation ROM 30   Apprehension -   Karines Relocation -   Jerk -   Load and Shift -   Obriens -   Speeds -   Impingement sign +   Supraspinatus/Empty Can +   External Rotation Strength -, 5/5   Lift Off/Belly Press -, 5/5   Neurovascular Intact       PROCEDURE:  Left Shoulder Injection with Ultrasound Guidance  Indication:Left Shoulder pain/swelling    After sterile prep, 6 cc of Xylocaine and 1 cc of Kenalog were injected into the left shoulder. Ultrasound images captured using [facility] Ultrasound machine and scanned into patient's chart.        [facility]  OFFICE PROCEDURE PROGRESS NOTE        Chart reviewed for the following:  Kashmir Ray M.D, have reviewed the History, Physical and updated the Allergic reactions for Montez Fabricio Montejo 1160 performed immediately prior to start of procedure:  Kashmir Ray M.D, have performed the following reviews on Alexandru Can prior to the start of the procedure:            * Patient was identified by name and date of birth   * Agreement on procedure being performed was verified  * Risks and Benefits explained to the patient  * Procedure site verified and marked as necessary  * Patient was positioned for comfort  * Consent was signed and verified     Time: 3:20 PM    Date of procedure: 7/24/2019    Procedure performed by:  Tanisha Brewer M.D    Provider assisted by: (see medication administration)    How tolerated by patient: tolerated the procedure well with no complications    Comments: none      IMAGING: XR of left shoulder dated 12/8/18 was reviewed and read: IMPRESSION:  No acute osseous abnormality    IMPRESSION:      ICD-10-CM ICD-9-CM    1. Subacromial bursitis of left shoulder joint M75.52 726.19         PLAN:   1. The patient presents today with left shoulder pain due to subacromial bursitis and I would like to try an injection today   Risk factors include: n/a  2. No ultrasound exam indicated today  3. Yes cortisone injection indicated today L SHOULDER US  4. No Physical/Occupational Therapy indicated today  5. No diagnostic test indicated today:   6. No durable medical equipment indicated today  7. No referral indicated today   8. No medications indicated today:   9. No Narcotic indicated today       RTC 3 weeks if pain continues      Scribed by [address]) as dictated by MD FARHAN Milligan, Dr. Jaguar Stinson, confirm that all documentation is accurate.     Jaguar Stinson M.D.   [facility]

## 2020-05-21 ENCOUNTER — HOSPITAL ENCOUNTER (OUTPATIENT)
Dept: PHYSICAL THERAPY | Age: 75
Discharge: HOME | End: 2020-05-21
Payer: MEDICARE

## 2020-05-21 PROCEDURE — 97110 THERAPEUTIC EXERCISES: CPT | Performed by: PHYSICAL THERAPIST

## 2020-05-21 PROCEDURE — 97140 MANUAL THERAPY 1/> REGIONS: CPT | Performed by: PHYSICAL THERAPIST

## 2020-05-21 PROCEDURE — 97162 PT EVAL MOD COMPLEX 30 MIN: CPT | Performed by: PHYSICAL THERAPIST

## 2020-05-21 NOTE — PROGRESS NOTES
PT DAILY TREATMENT NOTE/LUMBAR EVAL 10-18    Patient Name: Cruz Jackson  Date:2020  : 1945  [x]  Patient  Verified  Payor: [place] MEDICARE / Plan: [place] MEDICARE PART A & B / Product Type: Medicare /    In time:1234  Out time:121  Total Treatment Time (min): 47  Visit #: 1 of 8    Medicare/BCBS Only   Total Timed Codes (min):  24 1:1 Treatment Time:  47     Treatment Area: Low back pain [M54.5]  SUBJECTIVE  Pain Level (0-10 scale): 0/10  []constant [x]intermittent [x]improving []worsening []no change since onset    Any medication changes, allergies to medications, adverse drug reactions, diagnosis change, or new procedure performed?: [x] No    [] Yes (see summary sheet for update)  Subjective functional status/changes:     PLOF: hx of LBP on and off for 50 years  Limitations to PLOF: pain  Mechanism of Injury: ~3 weeks ago pt was cleaning his deck and then golfed a full 18 holes. Reports pain significantly increased the day following that. Current symptoms/Complaints: Pt reports pain w/ excessive bending, and heavy activity. Denies n/t or radicular symptoms.  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  Living Situation: lives w/ wife  Pt Goals: stop the pain and perform the ex's better  Barriers: [x]pain []financial []time []transportation []other  Motivation: good    OBJECTIVE/EXAMINATION    23 min [x]Eval                  []Re-Eval       14 min Therapeutic Exercise:  [] See flow sheet : instructed in and demo'd HEP, educated in adjustments to make for limitations in right knee mobility, educated in mechanics and goals of PT   Rationale: increase ROM, increase strength and improve coordination to improve the patients ability to improve mobility and decrease pain w/ activity    10 min Manual Therapy:  Left QL release, TPR/DTM to B lumbar paraspinals and left glutes in right sidelying   Rationale: decrease pain, increase ROM, increase tissue extensibility, decrease trigger points and increase postural awareness to improve activity tolerance and mobility          With   [x] TE   [] TA   [] neuro   [] other: Patient Education: [x] Review HEP    [] Progressed/Changed HEP based on:   [] positioning   [] body mechanics   [] transfers   [] heat/ice application    [] other:        Physical Therapy Evaluation - Lumbar Spine ([facility])  Symptoms:  Aggravated by:   [x] Bending [] Sitting [] Standing [] Walking   [] Moving [] Cough [] Sneeze [] Valsalva   [] AM  [] PM  Lying:  [] sup   [] pro   [] sidelying   [x] Other: heavy household or work chores     Eased by:    [] Bending [] Sitting [] Standing [] Walking   [x] Moving [] AM  [] PM  Lying: [] sup  [] pro  [] sidelying   [x] Other: heat     General Health:  Red Flags Indicated? [] Yes    [x] No  [] Yes [] No Recent weight change (If yes, due to dieting?  [] Yes  [] No)   [] Yes [] No Weakness in legs during walking  [] Yes [] No Unremitting pain at night  [] Yes [] No Abdominal pain or problems  [] Yes [] No Rectal bleeding  [] Yes [] No Feet more cold or painful in cold weather  [] Yes [] No Menstrual irregularities  [] Yes [] No Blood or pain with urination  [] Yes [] No Dysfunction of bowel or bladder  [] Yes [] No Recent illness within past 3 weeks (i.e, cold, flu)  [] Yes [] No Numbness/tingling in buttock/genitalia region    OBJECTIVE  Posture:  Lateral Shift: [] R    [] L     [] +  [] -  Kyphosis: [] Increased [] Decreased   []  WNL  Lordosis:  [] Increased [x] Decreased   [] WNL  Pelvic symmetry: [] WNL    [] Other:    Gait:  [x] Normal     [] Abnormal:    Active Movements: [] N/A   [] Too acute   [] Other:  ROM % AROM % PROM Comments:pain, area   Forward flexion 40-60 WNL     Extension 20-30 decr 25%     SB right 20-30 decr 25%     SB left 20-30 decr 25%     Rotation right 5-10      Rotation left 5-10        Dural Mobility:  SLR Sitting: [x] R    [x] L    [] +    [x] -  @ (degrees):           Supine: [x] R    [x] L    [] +    [x] -  @ (degrees):   Slump Test: [] R    [] L    [] +    [] -  @ (degrees):   Prone Knee Bend: [] R    [] L    [] +    [] -     Palpation  [] Min  [x] Mod  [] Severe    Location: left QL and glute med  [] Min  [] Mod  [] Severe    Location:  [] Min  [] Mod  [] Severe    Location:    Strength   L(0-5) R (0-5) N/T   Hip Flexion (L1,2) 4+ 4+ []   Knee Extension (L3,4) 5 5 []   Ankle Dorsiflexion (L4)   [x]   Great Toe Extension (L5)   [x]   Ankle Plantarflexion (S1)   [x]   Knee Flexion (S1,2) 4+ 4+ []   Upper Abdominals   []   Lower Abdominals   []   Paraspinals   []   Back Rotators   []   Gluteus Med 4+ 4+ []   Other   []     Special Tests         Hip: Mary:  [] R    [] L    [] +    [] -     Scour:  [] R    [] L    [] +    [] -     Piriformis: [x] R    [x] L    [x] +    [] -          Deficits: Guido's: [] R    [] L    [] +    [] -     Ken: [x] R    [x] L    [x] +    [] -     Hamstrings 90/90: (+) B    Gastrocsoleus (to neutral): Right: Left:       Other tests/comments: able to perform supine bridge, difficulty w/ PPT and holding TA iso throughout movement     Pain Level (0-10 scale) post treatment: 0/10    ASSESSMENT/Changes in Function: Pt presents w/ poor core activation and decreased flexibility. Pt relies pursed lip breathing during activity due to limited lung function from prior surgery and complications following that.      Patient will continue to benefit from skilled PT services to modify and progress therapeutic interventions, address functional mobility deficits, address ROM deficits, address strength deficits, analyze and address soft tissue restrictions, analyze and cue movement patterns, analyze and modify body mechanics/ergonomics, assess and modify postural abnormalities and instruct in home and community integration to attain remaining goals. [x]  See Plan of Care  []  See progress note/recertification  []  See Discharge Summary         Progress towards goals / Updated goals:  Short Term Goals: To be accomplished in 1 weeks:  1. Pt will be (I) and compliant w/ HEP to progress gains in PT. At eval: issued HEP  Long Term Goals: To be accomplished in 4 weeks:  1. Pt will improve FOTO to > or = to 65 to demo improved function. At eval: FOTO = 42  2. Pt will improve core activation as noted by supine bridge x10 w/correct TA iso for ease with lifting groceries from the floor. At eval: able to perform supine bridge, difficulty w/ PPT and holding TA iso throughout movement   3. Pt will improve erin test to (-) B for decrease stress on his low back. At eval: (+) erin test B  4. Pt will report > or = to 75% overall improvement in symptoms for ease w/ completing heavier household tasks.    At eval: 0%    PLAN  [x]  Upgrade activities as tolerated     []  Continue plan of care  []  Update interventions per flow sheet       []  Discharge due to:_  []  Other:_      Charles Ledesma, PT 5/21/2020  1:59 PM

## 2020-05-21 NOTE — PROGRESS NOTES
[facility]  [address] [address]  [place], [address]  [phone] fax    Plan of Care/ Statement of Necessity for Physical Therapy Services    Patient name: Desiree De Leon Start of Care: 2020   Referral source: Laurie Ba MD : 1945    Medical Diagnosis: Low back pain [M54.5]  Payor: [address] [place] [address] / Plan: [place] MEDICARE PART A & B / Product Type: Medicare /  Onset Date: ~3 weeks ago    Treatment Diagnosis: LBP   Prior Hospitalization: see medical history Provider#: [id number]   Medications: Verified on Patient summary List    Comorbidities: arthritis, back pain, cancer, COPD or emphysema, prior surgery   Prior Level of Function: hx of LBP on and off for 50 years; able to alleviate back pain w/ exercise and stretching      The Plan of Care and following information is based on the information from the initial evaluation. Assessment/ key information: Pt is a 65 y/o male w/ c/o pain w/ excessive bending, and heavy activity. Denies n/t or radicular symptoms. Pain improved w/ OTC medications and minimally w/ heat. States ~3 weeks ago pt was cleaning his deck and then golfed a full 18 holes. Reports pain significantly increased the day following that. Pt presents w/ poor core activation and decreased flexibility. Pt relies pursed lip breathing during activity due to limited lung function from prior surgery and complications following that. Pt will benefit from skilled PT to address the deficits and progress with pt goals. Evaluation Complexity History MEDIUM  Complexity : 1-2 comorbidities / personal factors will impact the outcome/ POC ; Examination HIGH Complexity : 4+ Standardized tests and measures addressing body structure, function, activity limitation and / or participation in recreation  ;Presentation MEDIUM Complexity : Evolving with changing characteristics  ; Clinical Decision Making MEDIUM Complexity : FOTO score of 26-74  Overall Complexity Rating: MEDIUM  Problem List: pain affecting function, decrease ROM, decrease strength, decrease ADL/ functional abilitiies, decrease activity tolerance and decrease flexibility/ joint mobility   Treatment Plan may include any combination of the following: Therapeutic exercise, Therapeutic activities, Neuromuscular re-education, Physical agent/modality, Manual therapy, Patient education and Functional mobility training  Patient / Family readiness to learn indicated by: asking questions, trying to perform skills and interest  Persons(s) to be included in education: patient (P)  Barriers to Learning/Limitations: None  Patient Goal (s): stop the pain and perform the ex's better  Patient Self Reported Health Status: good  Rehabilitation Potential: good    Short Term Goals: To be accomplished in 1 weeks:  1. Pt will be (I) and compliant w/ HEP to progress gains in PT. Long Term Goals: To be accomplished in 4 weeks:  1. Pt will improve FOTO to > or = to 65 to demo improved function. 2. Pt will improve core activation as noted by supine bridge x10 w/correct TA iso for ease with lifting groceries from the floor. 3. Pt will improve erin test to (-) B for decrease stress on his low back. 4. Pt will report > or = to 75% overall improvement in symptoms for ease w/ completing heavier household tasks. Frequency / Duration: Patient to be seen 2 times per week for 4 weeks. Patient/ Caregiver education and instruction: Diagnosis, prognosis, activity modification and exercises   [x]  Plan of care has been reviewed with PTA    Certification Period: 5/21/2020 - 6/19/2020  Mercedes Hidalgo, PT 5/21/2020 2:07 PM    ________________________________________________________________________    I certify that the above Therapy Services are being furnished while the patient is under my care. I agree with the treatment plan and certify that this therapy is necessary.     Physician's Signature:____________________ Date:____________Time: _________    Please sign and return to [facility] [address] [place] [address] & [facility]  [address] [facility] [address]  [place], [address]  [phone] fax

## 2020-05-28 ENCOUNTER — HOSPITAL ENCOUNTER (OUTPATIENT)
Dept: PHYSICAL THERAPY | Age: 75
Discharge: HOME | End: 2020-05-28
Payer: MEDICARE

## 2020-05-28 PROCEDURE — 97110 THERAPEUTIC EXERCISES: CPT

## 2020-05-28 PROCEDURE — 97140 MANUAL THERAPY 1/> REGIONS: CPT

## 2020-05-28 NOTE — PROGRESS NOTES
PT DAILY TREATMENT NOTE 10-18    Patient Name: Ramiro Mattson  Date:2020  : 1945  [x]  Patient  Verified  Payor: VA MEDICARE / Plan: VA MEDICARE PART A & B / Product Type: Medicare /    In time:2:08  Out time:2:49  Total Treatment Time (min): 41  Visit #: 2 of 8    Medicare/BCBS Only   Total Timed Codes (min):  31 1:1 Treatment Time:  41       Treatment Area: Low back pain [M54.5]    SUBJECTIVE  Pain Level (0-10 scale): 5-6  Any medication changes, allergies to medications, adverse drug reactions, diagnosis change, or new procedure performed?: [x] No    [] Yes (see summary sheet for update)  Subjective functional status/changes:   [] No changes reported  The pt reports his pain is not too bad currently. He reports trying his HEP this morning. OBJECTIVE    Modality rationale: decrease pain to improve the patients ability to perform functional tasks.     Min Type Additional Details    [] Estim:  []Unatt       []IFC  []Premod                        []Other:  []w/ice   []w/heat  Position:  Location:    [] Estim: []Att    []TENS instruct  []NMES                    []Other:  []w/US   []w/ice   []w/heat  Position:  Location:    []  Traction: [] Cervical       []Lumbar                       [] Prone          []Supine                       []Intermittent   []Continuous Lbs:  [] before manual  [] after manual    []  Ultrasound: []Continuous   [] Pulsed                           []1MHz   []3MHz W/cm2:  Location:    []  Iontophoresis with dexamethasone         Location: [] Take home patch   [] In clinic   10 [x]  Ice     []  heat  []  Ice massage  []  Laser   []  Anodyne Position:seated  Location:lumbar    []  Laser with stim  []  Other:  Position:  Location:    []  Vasopneumatic Device Pressure:       [] lo [] med [] hi   Temperature: [] lo [] med [] hi   [] Skin assessment post-treatment:  []intact []redness- no adverse reaction    []redness - adverse reaction:       23 min Therapeutic Exercise:  [x] See flow sheet :   Rationale: increase ROM and increase strength to improve the patients ability to perform functional tasks. 8 min Manual Therapy:  Left lumbar/QL STM, MFR, TPR   Rationale: decrease pain, increase ROM and increase tissue extensibility to improve ability for daily activities             With   [] TE   [] TA   [] neuro   [] other: Patient Education: [x] Review HEP    [] Progressed/Changed HEP based on:   [] positioning   [] body mechanics   [] transfers   [] heat/ice application    [] other:      Other Objective/Functional Measures: pt with some labored breathing with pursed lip breathing     Pain Level (0-10 scale) post treatment: 0    ASSESSMENT/Changes in Function:  The pt fatigues with therex quickly. Limited ability for right quad stretch with barrel secondary to previous TKA. Good relief was appreciated post treatment. Patient will continue to benefit from skilled PT services to modify and progress therapeutic interventions, address functional mobility deficits, address ROM deficits, address strength deficits, analyze and address soft tissue restrictions, analyze and cue movement patterns, analyze and modify body mechanics/ergonomics and assess and modify postural abnormalities to attain remaining goals. []  See Plan of Care  []  See progress note/recertification  []  See Discharge Summary         Progress towards goals / Updated goals:  Short Term Goals: To be accomplished in 1 weeks:  1. Pt will be (I) and compliant w/ HEP to progress gains in PT. At eval: issued HEP  Current: progressing on 5-28-20  Long Term Goals: To be accomplished in 4 weeks:  1. Pt will improve FOTO to > or = to 65 to demo improved function. At eval: FOTO = 42  2. Pt will improve core activation as noted by supine bridge x10 w/correct TA iso for ease with lifting groceries from the floor. At eval: able to perform supine bridge, difficulty w/ PPT and holding TA iso throughout movement          3.  Pt will improve erin test to (-) B for decrease stress on his low back. At eval: (+) erin test B  4. Pt will report > or = to 75% overall improvement in symptoms for ease w/ completing heavier household tasks.    At eval: 0%    PLAN  []  Upgrade activities as tolerated     [x]  Continue plan of care  []  Update interventions per flow sheet       []  Discharge due to:_  []  Other:_      Tonio Padron, PT 5/28/2020  2:11 PM    Future Appointments   Date Time Provider Ruth Ann Lewis   5/28/2020  2:15 PM [facility]   6/4/2020 11:15 AM [facility]

## 2020-06-04 ENCOUNTER — HOSPITAL ENCOUNTER (OUTPATIENT)
Dept: PHYSICAL THERAPY | Age: 75
Discharge: HOME | End: 2020-06-04
Payer: MEDICARE

## 2020-06-04 PROCEDURE — 97110 THERAPEUTIC EXERCISES: CPT

## 2020-06-04 PROCEDURE — 97140 MANUAL THERAPY 1/> REGIONS: CPT

## 2020-06-04 NOTE — PROGRESS NOTES
PT DAILY TREATMENT NOTE 10-18    Patient Name: Garcia Almendarez  Date:2020  : 1945  [x]  Patient  Verified  Payor: VA MEDICARE / Plan: VA MEDICARE PART A & B / Product Type: Medicare /    In time:11:15  Out time:11:55  Total Treatment Time (min): 40  Visit #: 3 of 8    Medicare/BCBS Only   Total Timed Codes (min):  40 1:1 Treatment Time:  40       Treatment Area: Low back pain [M54.5]    SUBJECTIVE  Pain Level (0-10 scale): 0  Any medication changes, allergies to medications, adverse drug reactions, diagnosis change, or new procedure performed?: [x] No    [] Yes (see summary sheet for update)  Subjective functional status/changes:   [] No changes reported  The pt reports he is not having pain at the moment. He states he played golf the other day with pain increase. OBJECTIVE    Modality rationale:    Min Type Additional Details    [] Estim:  []Unatt       []IFC  []Premod                        []Other:  []w/ice   []w/heat  Position:  Location:    [] Estim: []Att    []TENS instruct  []NMES                    []Other:  []w/US   []w/ice   []w/heat  Position:  Location:    []  Traction: [] Cervical       []Lumbar                       [] Prone          []Supine                       []Intermittent   []Continuous Lbs:  [] before manual  [] after manual    []  Ultrasound: []Continuous   [] Pulsed                           []1MHz   []3MHz W/cm2:  Location:    []  Iontophoresis with dexamethasone         Location: [] Take home patch   [] In clinic    []  Ice     []  heat  []  Ice massage  []  Laser   []  Anodyne Position:  Location:    []  Laser with stim  []  Other:  Position:  Location:    []  Vasopneumatic Device Pressure:       [] lo [] med [] hi   Temperature: [] lo [] med [] hi   [] Skin assessment post-treatment:  []intact []redness- no adverse reaction    []redness  adverse reaction:        32 min Therapeutic Exercise:  [x]?  See flow sheet :   Rationale: increase ROM and increase strength to improve the patients ability to perform functional tasks. 8 min Manual Therapy:  Left lumbar paraspinals, QL STM, MFR   Rationale: decrease pain, increase ROM and increase tissue extensibility to improve ability for daily activities             With   [] TE   [] TA   [] neuro   [] other: Patient Education: [x] Review HEP    [] Progressed/Changed HEP based on:   [] positioning   [] body mechanics   [] transfers   [] heat/ice application    [] other:      Other Objective/Functional Measures: added therex per flow sheet, + SOB requiring short rest period. O2 stats 93%. Pain Level (0-10 scale) post treatment: 0    ASSESSMENT/Changes in Function:  The pt progressed with therex without pain increase. Limited core engagement is noted with Trap bar exercises, requiring cues. Patient will continue to benefit from skilled PT services to modify and progress therapeutic interventions, address functional mobility deficits, address ROM deficits, address strength deficits, analyze and address soft tissue restrictions and analyze and cue movement patterns to attain remaining goals. []  See Plan of Care  []  See progress note/recertification  []  See Discharge Summary         Progress towards goals / Updated goals:  Short Term Goals: To be accomplished in 1 weeks:  1. Pt will be (I) and compliant w/ HEP to progress gains in PT. At eval: issued HEP  Current: progressing on 6-4-20  Long Term Goals: To be accomplished in 4 weeks:  1. Pt will improve FOTO to > or = to 65 to demo improved function.   At eval: FOTO = 42  2. Pt will improve core activation as noted by supine bridge x10 w/correct TA iso for ease with lifting groceries from the floor.   At eval: able to perform supine bridge, difficulty w/ PPT and holding TA iso throughout movement    Current: limited to date 6-4-20      3. Pt will improve erin test to (-) B for decrease stress on his low back.   At eval: (+) erin test B  4.  Pt will report > or = to 75% overall improvement in symptoms for ease w/ completing heavier household tasks.   At eval: 0%    PLAN  []  Upgrade activities as tolerated     [x]  Continue plan of care  []  Update interventions per flow sheet       []  Discharge due to:_  []  Other:_      Steve Edwards, PT 6/4/2020  11:21 AM    No future appointments.

## 2020-06-10 NOTE — PROGRESS NOTES
[facility]  [address]  [place], [address]  [phone] fax    Physical Therapy Discharge Summary    Patient name: Reba Arellano Start of Care: 2020   Referral source: Malik Chan MD : 1945   Medical/Treatment Diagnosis: Low back pain [M54.5]  Payor: Rama Yeager / Plan: VA MEDICARE PART A & B / Product Type: Medicare /  Onset Date:~ 3weeks prior to initial evaluation     Prior Hospitalization: see medical history Provider#: [id number]   Medications: Verified on Patient Summary List    Comorbidities: arthritis, back pain, cancer, COPD or emphysema, prior surgery   Prior Level of Function: hx of LBP on and off for 50 years; able to alleviate back pain w/ exercise and stretching  Visits from Start of Care: 3    Missed Visits: 0  Reporting Period : 2020 to 2020    Summary of Care:  Progress towards goals / Updated goals:  Short Term Goals: To be accomplished in 1 weeks:  1. Pt will be (I) and compliant w/ HEP to progress gains in PT. At eval: issued HEP  Current: progressing per pt report  Long Term Goals: To be accomplished in 4 weeks:  1. Pt will improve FOTO to > or = to 65 to demo improved function.   At eval: FOTO = 42  Current: unable to reassess prior to pt self DC  2. Pt will improve core activation as noted by supine bridge x10 w/correct TA iso for ease with lifting groceries from the floor.   At eval: able to perform supine bridge, difficulty w/ PPT and holding TA iso throughout movement    Current: limited to date    3. Pt will improve erin test to (-) B for decrease stress on his low back.   At eval: (+) erin test B  Current: unable to reassess prior to pt self DC  4.  Pt will report > or = to 75% overall improvement in symptoms for ease w/ completing heavier household tasks.   At eval: 0%  Current: unable to reassess prior to pt self DC    ASSESSMENT/RECOMMENDATIONS:  Pt completed just 3 visits in PT and was reporting no pain for all sessions except the beginning of his second session. However at the end of that session he reported no pain as well. Pt came into the clinic 6/9/2020 and requested DC stating he was going to the chiropractor and felt that is what he needed at this time. Pt has HEP.      [x]Discontinue therapy: []Patient has reached or is progressing toward set goals      [x]Patient is non-compliant or has abdicated      []Due to lack of appreciable progress towards set Ashkan Silva PT 6/10/2020 9:06 AM

## 2020-06-11 ENCOUNTER — APPOINTMENT (OUTPATIENT)
Dept: PHYSICAL THERAPY | Age: 75
End: 2020-06-11
Payer: MEDICARE

## 2020-06-18 ENCOUNTER — APPOINTMENT (OUTPATIENT)
Dept: PHYSICAL THERAPY | Age: 75
End: 2020-06-18
Payer: MEDICARE

## 2024-01-23 ENCOUNTER — OFFICE VISIT (OUTPATIENT)
Age: 79
End: 2024-01-23
Payer: MEDICARE

## 2024-01-23 VITALS
HEART RATE: 100 BPM | WEIGHT: 209.8 LBS | SYSTOLIC BLOOD PRESSURE: 120 MMHG | DIASTOLIC BLOOD PRESSURE: 72 MMHG | BODY MASS INDEX: 30.03 KG/M2 | HEIGHT: 70 IN | OXYGEN SATURATION: 92 %

## 2024-01-23 DIAGNOSIS — R94.31 ABNORMAL EKG: ICD-10-CM

## 2024-01-23 DIAGNOSIS — E78.00 HYPERCHOLESTEREMIA: ICD-10-CM

## 2024-01-23 DIAGNOSIS — E66.3 OVERWEIGHT (BMI 25.0-29.9): ICD-10-CM

## 2024-01-23 DIAGNOSIS — Z87.09 HISTORY OF COPD: ICD-10-CM

## 2024-01-23 DIAGNOSIS — R07.9 CHEST PAIN, UNSPECIFIED TYPE: Primary | ICD-10-CM

## 2024-01-23 DIAGNOSIS — E11.9 TYPE 2 DIABETES MELLITUS WITHOUT COMPLICATION, WITHOUT LONG-TERM CURRENT USE OF INSULIN (HCC): ICD-10-CM

## 2024-01-23 PROCEDURE — 1123F ACP DISCUSS/DSCN MKR DOCD: CPT | Performed by: INTERNAL MEDICINE

## 2024-01-23 PROCEDURE — G8484 FLU IMMUNIZE NO ADMIN: HCPCS | Performed by: INTERNAL MEDICINE

## 2024-01-23 PROCEDURE — G8417 CALC BMI ABV UP PARAM F/U: HCPCS | Performed by: INTERNAL MEDICINE

## 2024-01-23 PROCEDURE — 99204 OFFICE O/P NEW MOD 45 MIN: CPT | Performed by: INTERNAL MEDICINE

## 2024-01-23 PROCEDURE — 1036F TOBACCO NON-USER: CPT | Performed by: INTERNAL MEDICINE

## 2024-01-23 PROCEDURE — G8427 DOCREV CUR MEDS BY ELIG CLIN: HCPCS | Performed by: INTERNAL MEDICINE

## 2024-01-23 RX ORDER — M-VIT,TX,IRON,MINS/CALC/FOLIC 27MG-0.4MG
1 TABLET ORAL DAILY
COMMUNITY

## 2024-01-23 RX ORDER — TAMSULOSIN HYDROCHLORIDE 0.4 MG/1
0.4 CAPSULE ORAL DAILY
COMMUNITY
End: 2024-01-23

## 2024-01-23 RX ORDER — ASPIRIN 81 MG/1
81 TABLET ORAL DAILY
Qty: 100 TABLET | Refills: 0 | Status: SHIPPED | OUTPATIENT
Start: 2024-01-23

## 2024-01-23 RX ORDER — SIMVASTATIN 20 MG
20 TABLET ORAL NIGHTLY
Qty: 90 TABLET | Refills: 1 | Status: SHIPPED | OUTPATIENT
Start: 2024-01-23

## 2024-01-23 ASSESSMENT — ENCOUNTER SYMPTOMS
GASTROINTESTINAL NEGATIVE: 1
EYES NEGATIVE: 1
SHORTNESS OF BREATH: 1

## 2024-01-23 NOTE — PROGRESS NOTES
Room 5    Patient brought medication list.      Where do you normally have your labs drawn? [facility]       Do you need any refills today? No      Which local pharmacy do you use?   [facility]       Which mail order pharmacy do you use?   None        Are you here for surgical clearance? No, who will be doing your procedure/surgery (care team)?  N/A

## 2024-01-23 NOTE — PROGRESS NOTES
Santana Bertrand is a 78 y.o. year old male.    1/23/2024 patient seen as a new patient for chest pain which has been happening for the last 2 days.  Started in the left precordial and lasted many hours.  [facility] and E[facility] found to be abnormal and so the cardiology consult.  Prior to these days, no history of any significant chest pains.  Has underlying COPD and gets short of breath if he walks about 50 yards.  Denies dizziness palpitations or edema.          Review of Systems   Constitutional: Negative.    HENT: Negative.     Eyes: Negative.    Respiratory:  Positive for shortness of breath.    Cardiovascular:  Positive for chest pain.   Gastrointestinal: Negative.    Endocrine: Negative.    Genitourinary: Negative.    Musculoskeletal: Negative.    Neurological: Negative.    Psychiatric/Behavioral: Negative.     All other systems reviewed and are negative.        Physical Exam  Vitals and nursing note reviewed.   Constitutional:       Appearance: Normal appearance.   HENT:      Head: Normocephalic and atraumatic.      Nose: Nose normal.   Eyes:      Conjunctiva/sclera: Conjunctivae normal.   Cardiovascular:      Rate and Rhythm: Normal rate and regular rhythm.      Pulses: Normal pulses.      Heart sounds: Heart sounds are distant.   Pulmonary:      Effort: Pulmonary effort is normal.      Breath sounds: Wheezing (mild diffuse exp) present.   Abdominal:      General: Bowel sounds are normal.      Palpations: Abdomen is soft.   Musculoskeletal:         General: Normal range of motion.   Skin:     General: Skin is warm and dry.   Neurological:      General: No focal deficit present.      Mental Status: He is alert and oriented to person, place, and time.   Psychiatric:         Mood and Affect: Mood normal.         Behavior: Behavior normal.        No Known Allergies    Past Medical History:   Diagnosis Date    Arthritis     Cancer (HCC) 2012    lung    Chronic obstructive pulmonary

## 2024-01-29 ENCOUNTER — HOSPITAL ENCOUNTER (OUTPATIENT)
Facility: HOSPITAL | Age: 79
Discharge: HOME | End: 2024-02-01
Attending: INTERNAL MEDICINE
Payer: MEDICARE

## 2024-01-29 ENCOUNTER — HOSPITAL ENCOUNTER (OUTPATIENT)
Facility: HOSPITAL | Age: 79
Discharge: HOME | End: 2024-01-31
Attending: INTERNAL MEDICINE
Payer: MEDICARE

## 2024-01-29 VITALS
WEIGHT: 209 LBS | HEART RATE: 89 BPM | HEIGHT: 70 IN | SYSTOLIC BLOOD PRESSURE: 142 MMHG | DIASTOLIC BLOOD PRESSURE: 89 MMHG | BODY MASS INDEX: 29.92 KG/M2

## 2024-01-29 DIAGNOSIS — R07.9 CHEST PAIN, UNSPECIFIED TYPE: ICD-10-CM

## 2024-01-29 LAB
ECHO BSA: 2.16 M2
NUC STRESS EJECTION FRACTION: 50 %
STRESS BASELINE DIAS BP: 89 MMHG
STRESS BASELINE HR: 88 BPM
STRESS BASELINE SYS BP: 142 MMHG
STRESS ESTIMATED WORKLOAD: 1 METS
STRESS EXERCISE DUR MIN: 4 MIN
STRESS EXERCISE DUR SEC: 0 SEC
STRESS PEAK DIAS BP: 83 MMHG
STRESS PEAK SYS BP: 146 MMHG
STRESS PERCENT HR ACHIEVED: 71 %
STRESS POST PEAK HR: 100 BPM
STRESS RATE PRESSURE PRODUCT: NORMAL BPM*MMHG
STRESS TARGET HR: 140 BPM
TID: 1.06

## 2024-01-29 PROCEDURE — 2580000003 HC RX 258: Performed by: INTERNAL MEDICINE

## 2024-01-29 PROCEDURE — 3430000000 HC RX DIAGNOSTIC RADIOPHARMACEUTICAL: Performed by: INTERNAL MEDICINE

## 2024-01-29 PROCEDURE — 93016 CV STRESS TEST SUPVJ ONLY: CPT | Performed by: INTERNAL MEDICINE

## 2024-01-29 PROCEDURE — 78452 HT MUSCLE IMAGE SPECT MULT: CPT | Performed by: INTERNAL MEDICINE

## 2024-01-29 PROCEDURE — 93017 CV STRESS TEST TRACING ONLY: CPT

## 2024-01-29 PROCEDURE — 93018 CV STRESS TEST I&R ONLY: CPT | Performed by: INTERNAL MEDICINE

## 2024-01-29 PROCEDURE — A9502 TC99M TETROFOSMIN: HCPCS | Performed by: INTERNAL MEDICINE

## 2024-01-29 PROCEDURE — 6360000002 HC RX W HCPCS: Performed by: INTERNAL MEDICINE

## 2024-01-29 RX ORDER — 0.9 % SODIUM CHLORIDE 0.9 %
250 INTRAVENOUS SOLUTION INTRAVENOUS
Status: COMPLETED | OUTPATIENT
Start: 2024-01-29 | End: 2024-01-29

## 2024-01-29 RX ORDER — REGADENOSON 0.08 MG/ML
0.4 INJECTION, SOLUTION INTRAVENOUS
Status: COMPLETED | OUTPATIENT
Start: 2024-01-29 | End: 2024-01-29

## 2024-01-29 RX ADMIN — REGADENOSON 0.4 MG: 0.08 INJECTION, SOLUTION INTRAVENOUS at 10:15

## 2024-01-29 RX ADMIN — TETROFOSMIN 11 MILLICURIE: 1.38 INJECTION, POWDER, LYOPHILIZED, FOR SOLUTION INTRAVENOUS at 08:18

## 2024-01-29 RX ADMIN — TETROFOSMIN 33 MILLICURIE: 1.38 INJECTION, POWDER, LYOPHILIZED, FOR SOLUTION INTRAVENOUS at 10:15

## 2024-01-29 RX ADMIN — SODIUM CHLORIDE 250 ML: 900 INJECTION, SOLUTION INTRAVENOUS at 10:15

## 2024-01-30 ENCOUNTER — TELEPHONE (OUTPATIENT)
Age: 79
End: 2024-01-30

## 2024-01-30 NOTE — TELEPHONE ENCOUNTER
Spoke with patient per Dr. Arita regarding nuclear stress test. Test reviewed.Please contact patient and do the following asap  Get this patient to see a provider in [place] office ASAP as he will need a cardiac catheterization. He voices understanding and acceptance of this advice and will call back if any further questions or concerns.

## 2024-01-30 NOTE — TELEPHONE ENCOUNTER
----- Message from Andreia Arita MD sent at 1/30/2024  9:37 AM EST -----  Please contact patient and do the following asap    Get this patient to see a provider in [place] office ASAP as he will need a cardiac catheterization.

## 2024-01-30 NOTE — RESULT ENCOUNTER NOTE
Please contact patient and do the following asap    Get this patient to see a provider in [place] office ASAP as he will need a cardiac catheterization.

## 2024-02-02 ENCOUNTER — OFFICE VISIT (OUTPATIENT)
Age: 79
End: 2024-02-02
Payer: MEDICARE

## 2024-02-02 VITALS
HEIGHT: 70 IN | HEART RATE: 89 BPM | DIASTOLIC BLOOD PRESSURE: 86 MMHG | BODY MASS INDEX: 29.78 KG/M2 | WEIGHT: 208 LBS | SYSTOLIC BLOOD PRESSURE: 129 MMHG | OXYGEN SATURATION: 91 %

## 2024-02-02 DIAGNOSIS — R06.09 DYSPNEA ON EXERTION: ICD-10-CM

## 2024-02-02 DIAGNOSIS — I70.90 ATHEROSCLEROSIS: ICD-10-CM

## 2024-02-02 DIAGNOSIS — E78.5 HYPERLIPIDEMIA, UNSPECIFIED HYPERLIPIDEMIA TYPE: Primary | ICD-10-CM

## 2024-02-02 DIAGNOSIS — R07.9 CHEST PAIN, UNSPECIFIED TYPE: ICD-10-CM

## 2024-02-02 PROCEDURE — 99214 OFFICE O/P EST MOD 30 MIN: CPT | Performed by: INTERNAL MEDICINE

## 2024-02-02 PROCEDURE — 1123F ACP DISCUSS/DSCN MKR DOCD: CPT | Performed by: INTERNAL MEDICINE

## 2024-02-02 ASSESSMENT — ENCOUNTER SYMPTOMS
ABDOMINAL PAIN: 0
COUGH: 0
BLOOD IN STOOL: 0
WHEEZING: 0
COLOR CHANGE: 0
APNEA: 0
SHORTNESS OF BREATH: 1
CONSTIPATION: 0
NAUSEA: 0
DIARRHEA: 0
CHEST TIGHTNESS: 1

## 2024-02-02 NOTE — PATIENT INSTRUCTIONS
Mediterranean diet may also include red wine with your meal--1 glass each day for women and up to 2 glasses a day for men.  Tips for eating at home  Use herbs, spices, garlic, lemon zest, and citrus juice instead of salt to add flavor to foods.  Add avocado slices to your sandwich instead of viera.  Have fish for lunch or dinner instead of red meat. Brush the fish with olive oil, and broil or grill it.  Sprinkle your salad with seeds or nuts instead of cheese.  Cook with olive or canola oil instead of butter or oils that are high in saturated fat.  Switch from 2% milk or whole milk to 1% or fat-free milk.  Dip raw vegetables in a vinaigrette dressing or hummus instead of dips made from mayonnaise or sour cream.  Have a piece of fruit for dessert instead of a piece of cake. Try baked apples, or have some dried fruit.  Tips for eating out  Try broiled, grilled, baked, or poached fish instead of having it fried or breaded.  Ask your  to have your meals prepared with olive oil instead of butter.  Order dishes made with marinara sauce or sauces made from olive oil. Avoid sauces made from cream or mayonnaise.  Choose whole-grain breads, whole wheat pasta and pizza crust, brown rice, beans, and lentils.  Cut back on butter or margarine on bread. Instead, you can dip your bread in a small amount of olive oil.  Ask for a side salad or grilled vegetables instead of french fries or chips.  Where can you learn more?  Go to https://www.[employer].net/patientEd and enter O407 to learn more about \"Learning About the Mediterranean Diet.\"  Current as of: May 9, 2022               Content Version: 13.5  © 3965-9962 [employer].   Care instructions adapted under license by [employer]. If you have questions about a medical condition or this instruction, always ask your healthcare professional. [employer] disclaims any warranty or liability for your use of this information.

## 2024-02-02 NOTE — PROGRESS NOTES
Have you had Fatigue?  No     2.   Have you had have you had Chest Pain? Yes if so how long: on and off for years how frequent: every day . Is          3.   Have you had Dyspnea (SOB) ? Yes if so how long: a couple weeks   can you walk 2 blocks or a flight of stairs without SOB : no    4.   Have you had Orthopnea? No     5.   Have you had PND? No   6.   Have you had leg swelling? No   7.    Have you had any weight gain? No     8. Have you had any palpitations? No      9. Have you had any syncope? No   10. Do you have any wounds on legs? no       
MD Jnay   aspirin 81 MG EC tablet Take 1 tablet by mouth daily 1/23/24   Andreia Arita MD   simvastatin (ZOCOR) 20 MG tablet Take 1 tablet by mouth nightly 1/23/24   Andreia Arita MD   testosterone (ANDROGEL) 25 MG/2.5GM (1%) GEL 1 % gel Place 25 mg onto the skin daily.    Automatic Reconciliation, Ar       No results found for: \"LIPIDPAN\", \"BMP\", \"CMP\"     There were no vitals taken for this visit.    HPI    Review of Systems   Constitutional:  Negative for activity change, appetite change, diaphoresis, fatigue and unexpected weight change.   Eyes:  Negative for visual disturbance.   Respiratory:  Positive for chest tightness and shortness of breath. Negative for apnea, cough and wheezing.    Cardiovascular:  Negative for chest pain, palpitations and leg swelling.   Gastrointestinal:  Negative for abdominal pain, blood in stool, constipation, diarrhea and nausea.   Endocrine: Negative for cold intolerance and heat intolerance.   Genitourinary:  Negative for decreased urine volume and difficulty urinating.   Musculoskeletal:  Negative for gait problem, myalgias and neck pain.   Skin:  Negative for color change, pallor and rash.   Neurological:  Negative for dizziness, syncope, facial asymmetry, speech difficulty, weakness, light-headedness and numbness.   Psychiatric/Behavioral:  Negative for confusion and sleep disturbance.          Physical Exam  Vitals reviewed.   Constitutional:       General: He is not in acute distress.     Appearance: Normal appearance. He is not ill-appearing or diaphoretic.   HENT:      Head: Normocephalic and atraumatic.   Eyes:      General: No scleral icterus.        Right eye: No discharge.         Left eye: No discharge.      Conjunctiva/sclera: Conjunctivae normal.   Neck:      Vascular: No carotid bruit.   Cardiovascular:      Rate and Rhythm: Normal rate and regular rhythm.      Heart sounds: Normal heart sounds. No murmur heard.     No friction rub. No gallop.

## 2024-02-13 NOTE — H&P (VIEW-ONLY)
discharge.         Left eye: No discharge.      Conjunctiva/sclera: Conjunctivae normal.   Neck:      Vascular: No carotid bruit.   Cardiovascular:      Rate and Rhythm: Normal rate and regular rhythm.      Heart sounds: Normal heart sounds. No murmur heard.     No friction rub. No gallop.   Pulmonary:      Effort: Pulmonary effort is normal. No respiratory distress.      Breath sounds: Normal breath sounds. No wheezing, rhonchi or rales.   Chest:      Chest wall: No tenderness.   Abdominal:      General: Bowel sounds are normal. There is no distension.      Palpations: Abdomen is soft.      Tenderness: There is no abdominal tenderness. There is no guarding.   Musculoskeletal:         General: No swelling or tenderness.      Cervical back: Neck supple.      Right lower leg: No edema.      Left lower leg: No edema.   Skin:     General: Skin is warm and dry.      Findings: No erythema or rash.   Neurological:      Mental Status: He is alert. Mental status is at baseline.      Motor: No weakness.      Gait: Gait normal.   Psychiatric:         Mood and Affect: Mood normal.         Behavior: Behavior normal.         Thought Content: Thought content normal.         ASSESSMENT and PLAN  Mr. Bertrand has a reminder for a \"due or due soon\" health maintenance. I have asked that he contact his primary care provider for follow-up on this health maintenance.    Possibly cardiogenic chest pain - Has a high risk stress test 1/2024, continue with aspirin 81mg po daily, if increasing frequency duration intensity or unrelieved pain to visit the emergency department.  Offer to patient [facility] possible PCI.  Check 2d echo evaluate for structural heart disease. Metoprolol tartrate 12.5mg po bid.      Dyspnea on exertion - Check as above, check 2d echo evaluate for structural heart disease Followup the results.  May be in part due to obstructive coronary artery disease.      Abnormal Stress Test - High Risk Stress Test as

## 2024-02-14 ENCOUNTER — OFFICE VISIT (OUTPATIENT)
Age: 79
End: 2024-02-14
Payer: MEDICARE

## 2024-02-14 VITALS
HEART RATE: 86 BPM | BODY MASS INDEX: 29.78 KG/M2 | DIASTOLIC BLOOD PRESSURE: 68 MMHG | HEIGHT: 70 IN | WEIGHT: 208 LBS | OXYGEN SATURATION: 94 % | SYSTOLIC BLOOD PRESSURE: 110 MMHG

## 2024-02-14 DIAGNOSIS — Z87.898 HISTORY OF CHEST PAIN: Primary | ICD-10-CM

## 2024-02-14 DIAGNOSIS — R06.09 DYSPNEA ON EXERTION: ICD-10-CM

## 2024-02-14 DIAGNOSIS — R94.39 ABNORMAL STRESS TEST: ICD-10-CM

## 2024-02-14 PROCEDURE — 99214 OFFICE O/P EST MOD 30 MIN: CPT | Performed by: INTERNAL MEDICINE

## 2024-02-14 PROCEDURE — 1123F ACP DISCUSS/DSCN MKR DOCD: CPT | Performed by: INTERNAL MEDICINE

## 2024-02-14 NOTE — PROGRESS NOTES
Have you had Fatigue?  No      2.   Have you had have you had Chest Pain? Yes     How long: Years   How frequent: daily   Is it  substernal? Yes Pressure like? Yes  Comes on with activity or with large meals? No      Is it relieved by rest or NTG? N/A       3.   Have you had Dyspnea (SOB)? Yes     How long: Years    can you walk 2 blocks or a flight of stairs without SOB? No    4.   Have you had Orthopnea? No      5.   Have you had PND? No     6.   Have you had leg swelling? No     7.    Have you had any weight gain? No    8.    Have you had any palpitations? No     9.    Have you had any syncope? No     10. Do you have any wounds on legs? No   
above    Atherosclerosis - Continue aspirin 81mg po daily, continue simvastatin 20mg po hs.      HLD - Continue simvastatin 20mg po hs, survey lipid panel periodically.

## 2024-02-14 NOTE — PATIENT INSTRUCTIONS
Learning About the Mediterranean Diet  What is the Mediterranean diet?     The Mediterranean diet is a style of eating rather than a diet plan. It features foods eaten in [place], [place], [place], and other countries along the [place] Sea. It emphasizes eating foods like fish, fruits, vegetables, beans, high-fiber breads and whole grains, nuts, and olive oil. This style of eating includes limited red meat, cheese, and sweets.  Why choose the Mediterranean diet?  A Mediterranean-style diet may improve heart health. It contains more fat than other heart-healthy diets. But the fats are mainly from nuts, unsaturated oils (such as fish oils and olive oil), and certain nut or seed oils (such as canola, soybean, or flaxseed oil). These fats may help protect the heart and blood vessels.  How can you get started on the Mediterranean diet?  Here are some things you can do to switch to a more Mediterranean way of eating.  What to eat  Eat a variety of fruits and vegetables each day, such as grapes, blueberries, tomatoes, broccoli, peppers, figs, olives, spinach, eggplant, beans, lentils, and chickpeas.  Eat a variety of whole-grain foods each day, such as oats, brown rice, and whole wheat bread, pasta, and couscous.  Eat fish at least 2 times a week. Try tuna, salmon, mackerel, lake trout, herring, or sardines.  Eat moderate amounts of low-fat dairy products, such as milk, cheese, or yogurt.  Eat moderate amounts of poultry and eggs.  Choose healthy (unsaturated) fats, such as nuts, olive oil, and certain nut or seed oils like canola, soybean, and flaxseed.  Limit unhealthy (saturated) fats, such as butter, palm oil, and coconut oil. And limit fats found in animal products, such as meat and dairy products made with whole milk. Try to eat red meat only a few times a month in very small amounts.  Limit sweets and desserts to only a few times a week. This includes sugar-sweetened drinks like soda.  The

## 2024-02-16 PROBLEM — R07.9 CHEST PAIN: Status: ACTIVE | Noted: 2024-02-02

## 2024-02-22 LAB
ANION GAP SERPL CALCULATED.3IONS-SCNC: 14 MMOL/L (ref 3–15)
BUN BLDV-MCNC: 20 MG/DL (ref 6–22)
CALCIUM SERPL-MCNC: 9.3 MG/DL (ref 8.4–10.5)
CHLORIDE BLD-SCNC: 100 MMOL/L (ref 98–110)
CO2: 26 MMOL/L (ref 20–32)
CREAT SERPL-MCNC: 1.1 MG/DL (ref 0.8–1.6)
GLOMERULAR FILTRATION RATE: >60 ML/MIN/1.73 SQ.M.
GLUCOSE: 182 MG/DL (ref 70–99)
HCT VFR BLD CALC: 53.5 % (ref 37.8–52.2)
HEMOGLOBIN: 17 G/DL (ref 12.6–17.1)
INR BLD: 1.17 (ref 0.89–1.29)
MCH RBC QN AUTO: 29 PG (ref 26–34)
MCHC RBC AUTO-ENTMCNC: 32 G/DL (ref 31–36)
MCV RBC AUTO: 90 FL (ref 80–95)
PDW BLD-RTO: 14.6 % (ref 10–15.5)
PLATELET # BLD: 191 K/UL (ref 140–440)
PMV BLD AUTO: 11.5 FL (ref 9–13)
POTASSIUM SERPL-SCNC: 4.9 MMOL/L (ref 3.5–5.5)
PROTHROMBIN TIME: 12.2 SEC (ref 9–13)
RBC: 5.92 M/UL (ref 3.8–5.8)
SODIUM BLD-SCNC: 140 MMOL/L (ref 133–145)
WBC: 12 K/UL (ref 4–11)

## 2024-02-26 NOTE — PROGRESS NOTES
Confirmed arrival time of 1145 for scheduled LHC on 2/28 at 1145. Reviewed pre-procedure instructions.

## 2024-02-27 ENCOUNTER — HOSPITAL ENCOUNTER (OUTPATIENT)
Facility: HOSPITAL | Age: 79
Setting detail: OUTPATIENT SURGERY
Discharge: HOME | End: 2024-02-27
Attending: INTERNAL MEDICINE | Admitting: INTERNAL MEDICINE
Payer: MEDICARE

## 2024-02-27 VITALS
RESPIRATION RATE: 16 BRPM | HEIGHT: 70 IN | BODY MASS INDEX: 29.78 KG/M2 | DIASTOLIC BLOOD PRESSURE: 81 MMHG | OXYGEN SATURATION: 87 % | HEART RATE: 103 BPM | WEIGHT: 208 LBS | SYSTOLIC BLOOD PRESSURE: 137 MMHG

## 2024-02-27 DIAGNOSIS — R07.9 CHEST PAIN: ICD-10-CM

## 2024-02-27 PROCEDURE — 99152 MOD SED SAME PHYS/QHP 5/>YRS: CPT | Performed by: INTERNAL MEDICINE

## 2024-02-27 PROCEDURE — 6360000004 HC RX CONTRAST MEDICATION: Performed by: INTERNAL MEDICINE

## 2024-02-27 PROCEDURE — 6360000002 HC RX W HCPCS: Performed by: INTERNAL MEDICINE

## 2024-02-27 PROCEDURE — 2500000003 HC RX 250 WO HCPCS: Performed by: INTERNAL MEDICINE

## 2024-02-27 PROCEDURE — C1894 INTRO/SHEATH, NON-LASER: HCPCS | Performed by: INTERNAL MEDICINE

## 2024-02-27 PROCEDURE — 6370000000 HC RX 637 (ALT 250 FOR IP): Performed by: INTERNAL MEDICINE

## 2024-02-27 PROCEDURE — C1769 GUIDE WIRE: HCPCS | Performed by: INTERNAL MEDICINE

## 2024-02-27 PROCEDURE — 76000 FLUOROSCOPY <1 HR PHYS/QHP: CPT | Performed by: INTERNAL MEDICINE

## 2024-02-27 PROCEDURE — 93458 L HRT ARTERY/VENTRICLE ANGIO: CPT | Performed by: INTERNAL MEDICINE

## 2024-02-27 PROCEDURE — C1713 ANCHOR/SCREW BN/BN,TIS/BN: HCPCS | Performed by: INTERNAL MEDICINE

## 2024-02-27 PROCEDURE — 2709999900 HC NON-CHARGEABLE SUPPLY: Performed by: INTERNAL MEDICINE

## 2024-02-27 RX ORDER — HEPARIN SODIUM 1000 [USP'U]/ML
INJECTION, SOLUTION INTRAVENOUS; SUBCUTANEOUS PRN
Status: DISCONTINUED | OUTPATIENT
Start: 2024-02-27 | End: 2024-02-27 | Stop reason: HOSPADM

## 2024-02-27 RX ORDER — NITROGLYCERIN 0.4 MG/1
0.4 TABLET SUBLINGUAL EVERY 5 MIN PRN
Qty: 25 TABLET | Refills: 3 | Status: SHIPPED | OUTPATIENT
Start: 2024-02-27

## 2024-02-27 RX ORDER — MIDAZOLAM HYDROCHLORIDE 1 MG/ML
INJECTION INTRAMUSCULAR; INTRAVENOUS PRN
Status: DISCONTINUED | OUTPATIENT
Start: 2024-02-27 | End: 2024-02-27 | Stop reason: HOSPADM

## 2024-02-27 RX ORDER — FENTANYL CITRATE 50 UG/ML
INJECTION, SOLUTION INTRAMUSCULAR; INTRAVENOUS PRN
Status: DISCONTINUED | OUTPATIENT
Start: 2024-02-27 | End: 2024-02-27 | Stop reason: HOSPADM

## 2024-02-27 RX ORDER — ASPIRIN 81 MG/1
81 TABLET, CHEWABLE ORAL ONCE
Status: COMPLETED | OUTPATIENT
Start: 2024-02-27 | End: 2024-02-27

## 2024-02-27 RX ADMIN — ASPIRIN 81 MG: 81 TABLET, CHEWABLE ORAL at 12:24

## 2024-02-27 NOTE — PROGRESS NOTES
1230  Cath holding summary  Patient escorted to cath holding from waiting area ambulatory, alert and oriented x 4, voicing no complaints.  Changed into gown and placed on monitor.   NPO since MN.  Lab results, med rec and H&P reviewed on chart.  PIV x 2 inserted without difficulty. Family at bedside.     1335  TRANSFER - OUT REPORT:  Verbal report given to [place](name) on Santana Bertrand  being transferred to Cath Lab (unit) for ordered procedure  Report consisted of patient’s Situation, Background, Assessment and   Recommendations(SBAR).   Information from the following report(s) SBAR, Intake/Output, MAR, and Pre Procedure Checklist was reviewed with the receiving nurse.  Lines:    Opportunity for questions and clarification was provided.    Patient transported with:  Registered Nurse    1415  TRANSFER - IN REPORT:  Verbal report received from Angela (name) on Santana Bertrand  being received from Cath Lab (unit) for ordered procedure   Report consisted of patient’s Situation, Background, Assessment and   Recommendations(SBAR).   Information from the following report(s) SBAR, Procedure Summary, Intake/Output, and MAR was reviewed with the receiving nurse.  Opportunity for questions and clarification was provided.    Assessment completed upon patient’s arrival to unit and care assumed.     Dx left heart cath, with medical management. TR band to right radial with 10cc. Denies pain. A/Ox4.

## 2024-02-27 NOTE — DISCHARGE INSTRUCTIONS
Cardiac Catheterization Discharge Instructions    Site Care  Check puncture site frequently for swelling or bleeding. If there is any bleeding, lie down (if access is groin), hold firm pressure with a clean towel or wash cloth. If bleeding doesn't stop, call 911. Notify your doctor for any redness, swelling, drainage, or oozing from the puncture site.   If extremity becomes cold, numb or painful go to the emergency room.   You may remove the bandage from your Right, Arm in 24 hours. You may shower at that point. No hot tubs, bath tubs, or swimming for 1 week.   After shower, pat the incision dry and you can place a clean band-aid over the site.   No lotions, ointments, or powders over puncture site for 1 week.   Use a clean band-aid over the puncture site each day for 5 days. Change band-aid daily.   Cold pack or ice can be placed on the area for 10 to 20 minutes to help with the soreness of the site.     Activity  Activity should be limited for the next 48 hours. Climb as little stairs as possible and avoiding any bending, stooping, or strenuous activity for 48 hours. No heavy lifting (anything heavier than a gallon of milk) for 5 days.   You can walk around the house and do light activity such as cooking.   If procedure was done through the groin, avoid stairs for the first day or two  If the procedure was done through the wrist, do not bend your wrist deeply for the first couple of days. Be careful when getting up from sitting as to not use the affected wrist for the first 48 hours.     Diet  You may resume your usual diet. Drink more fluids than usual to flush kidneys. If you have kidney, heart or liver disease talk with your doctor on fluid limitations  Keep eating a heart healthy diet full of fruits, vegetables, and whole grains.     Medication  Your doctor will tell you when to resume your medications.  If you take blood thinners such as warfarin (Coumadin), rivaroxaban (Xarelto), or apixaban (Eliquis) talk

## 2024-02-27 NOTE — INTERVAL H&P NOTE
Update History & Physical    The patient's History and Physical of December 14, 2023 was reviewed with the patient and I examined the patient. There was no change. The surgical site was confirmed by the patient and me.     The benefits and risks of cardiac catheterization have been discussed in detail with the patient or healthcare power of . Patient understands  risk of potential cath complications including but not limited to bleeding, infection, dialysis or renal function decline, difficulty healing the arteriotomy access site which may require surgical repair, potential thromboembolic complications which could result in stroke, myocardial infarction, vascular injury, loss of limb or organ function and/or death and potential allergic reaction to contrast dye or other medication used during the procedure. Patient is also aware of the therapeutic implications for medical management vs coronary artery bypass surgery vs percutaneous coronary intervention in treatment of coronary artery disease. The additional risks for percutaneous coronary artery intervention include the need for emergent bypass surgery  and for restenosis for plain balloon angioplasty, for bare metal stent, and for drug eluting stent implants. The need for mandatory uninterrupted dual antiplatelet therapy with lifelong Aspirin combined with Plavix or similar for up to 12 months following drug eluting stents, and minimum 1 month following bare metal stents to prevent stent thrombosis which is the equivalent of acute heart attack has been reviewed in detail.  Patient or healthcare power of  verbalized understanding and all questions were answered.    EXAMINATION:  General:  Alert, cooperative, no distress  Head:  Normocephalic, without obvious abnormality, atraumatic.  Eyes:  Conjunctivae/corneas clear  Lungs:   Clear to auscultation bilaterally, no wheezes, no rales, no rhonchi  Heart:  Regular rate and rhythm, S1, S2 normal, no  PHI TURNER/rolling walker

## 2024-02-27 NOTE — PROGRESS NOTES
Cath holding summary:    1515: Nurse handoff report received from SOWMYA Chance on Santana Bertrand. Site check verified together, patient A&Ox 4, no c/o pain, currently eating.    1630: AVS Discharge instructions reviewed with patient, all questions answered. Patient verbalized understanding, copy given. Procedural site within normal limits, PIV removed. No hematoma or bleeding noted from procedural or IV site. A&Ox4 no c/o pain, discharged in stable condition. Escorted out to vehicle for transport home.

## 2024-02-27 NOTE — PRE SEDATION
Sedation Plan  ASA: class 3 - patient with severe systemic disease     Mallampati class: II - soft palate, uvula, fauces visible.    Sedation plan: local anesthesia and moderate (conscious sedation)    Risks, benefits, and alternatives discussed with patient.  Use of blood products discussed with patient who consented to blood products.       Immediate reassessment prior to sedation:  Patient's status reviewed and vital signs assessed; acceptable to perform procedure and proceed to administer sedation as planned.

## 2024-02-28 LAB — ECHO BSA: 2.16 M2

## 2024-03-07 ENCOUNTER — OFFICE VISIT (OUTPATIENT)
Age: 79
End: 2024-03-07
Payer: MEDICARE

## 2024-03-07 VITALS
BODY MASS INDEX: 29.43 KG/M2 | HEART RATE: 83 BPM | OXYGEN SATURATION: 94 % | SYSTOLIC BLOOD PRESSURE: 101 MMHG | HEIGHT: 70 IN | DIASTOLIC BLOOD PRESSURE: 61 MMHG | WEIGHT: 205.6 LBS

## 2024-03-07 DIAGNOSIS — I25.118 CORONARY ARTERY DISEASE INVOLVING NATIVE CORONARY ARTERY OF NATIVE HEART WITH OTHER FORM OF ANGINA PECTORIS (HCC): Primary | ICD-10-CM

## 2024-03-07 DIAGNOSIS — Z87.09 HISTORY OF COPD: ICD-10-CM

## 2024-03-07 DIAGNOSIS — E11.9 TYPE 2 DIABETES MELLITUS WITHOUT COMPLICATION, WITHOUT LONG-TERM CURRENT USE OF INSULIN (HCC): ICD-10-CM

## 2024-03-07 DIAGNOSIS — E78.00 HYPERCHOLESTEREMIA: ICD-10-CM

## 2024-03-07 DIAGNOSIS — Z85.118 HISTORY OF LUNG CANCER: ICD-10-CM

## 2024-03-07 PROCEDURE — 1123F ACP DISCUSS/DSCN MKR DOCD: CPT | Performed by: INTERNAL MEDICINE

## 2024-03-07 PROCEDURE — 99214 OFFICE O/P EST MOD 30 MIN: CPT | Performed by: INTERNAL MEDICINE

## 2024-03-07 RX ORDER — MONTELUKAST SODIUM 10 MG/1
10 TABLET ORAL NIGHTLY
COMMUNITY

## 2024-03-07 ASSESSMENT — ENCOUNTER SYMPTOMS
SHORTNESS OF BREATH: 1
EYES NEGATIVE: 1
GASTROINTESTINAL NEGATIVE: 1

## 2024-03-07 NOTE — PROGRESS NOTES
Room 7    Patient brought medication list.    1. Have you been to the ER, urgent care clinic since your last visit?  Hospitalized since your last visit?     Yes When: 02/27/24 Where: [facility] Reason for visit: Chest Pain      2.  Where do you normally have your labs drawn?  [facility]      3. Do you need any refills today? No      4. Which local pharmacy do you use?   [facility]      5. Which mail order pharmacy do you use?   None       6. Are you here for surgical clearance? No,  who will be doing your procedure/surgery (care team)?   N/A  
rhythm.    ECG: The ECG had minor ST changes of no clinical significance.    Stress Test: A pharmacological stress test was performed using lexiscan. The patient reported no symptoms during the stress test. Hemodynamics are adequate for diagnosis. Blood pressure demonstrated a blunted response and heart rate demonstrated a normal response to stress. The patient's heart rate recovery was normal.    High Risk Stress Test with partially fixed and partially reversible inferior/inferoseptal defect present borderline EF  SSS = 17, SRS = 13, SDS = 3    Signed by: Mateo Padron MD on 1/29/2024  6:03 PM  02/27/24    CARDIAC PROCEDURE 02/28/2024  7:26 AM (Final)    Conclusion  Severe Coronary Artery Disease with  of the RCA corresponding to stress test  100% RCA with R to R and L to R collateralization  LCFX diffuse pro mid distal 10-30%, OM1 30-40% stenosis  Ostial LAD 30-40% stenosis mLAD 10-30% stenosis  LM luminal irregularities    Plan: Continue with aspirin, statin, bb as can tolerate, Nitro PRN, will continue anti-anginal therapy in clinic and recommend blood pressure log to further titration.    Signed by: Mateo Padron MD on 2/28/2024  7:26 AM    ASSESSMENT & PLAN    No results found for: \"CHOL\"  No results found for: \"TRIG\"  No results found for: \"HDL\"  No results found for: \"LDLCHOLESTEROL\", \"LDLCALC\"  No results found for: \"VLDL\"    Pertinent laboratory and test data reviewed and discussed with patient.      1/23/2024  Plan for medicines : Chest pain is atypical but given his age and multiple risk factors, get a stress test to evaluate ischemia.  Will try to get his labs including lipids from [facility] office which were done about 3 months ago.  Exercise Plan: Walk as much as possible and preferably 30 minutes a day if he cannot.  Diet plan: Mediterranean diet guidelines explained and printed.    Santana was seen today for follow-up.    Diagnoses and all orders for this visit:    Coronary artery disease

## 2024-10-22 DIAGNOSIS — E78.00 HYPERCHOLESTEREMIA: ICD-10-CM

## 2024-10-22 RX ORDER — SIMVASTATIN 20 MG
20 TABLET ORAL NIGHTLY
Qty: 90 TABLET | Refills: 1 | OUTPATIENT
Start: 2024-10-22

## 2024-11-07 DIAGNOSIS — E78.00 HYPERCHOLESTEREMIA: ICD-10-CM

## 2024-11-07 RX ORDER — SIMVASTATIN 20 MG
20 TABLET ORAL NIGHTLY
Qty: 90 TABLET | Refills: 1 | OUTPATIENT
Start: 2024-11-07

## 2025-08-06 ENCOUNTER — OFFICE VISIT (OUTPATIENT)
Dept: CARDIOTHORACIC SURGERY | Age: 80
End: 2025-08-06
Payer: MEDICARE

## 2025-08-06 VITALS
HEIGHT: 70 IN | HEART RATE: 102 BPM | BODY MASS INDEX: 28.2 KG/M2 | DIASTOLIC BLOOD PRESSURE: 69 MMHG | OXYGEN SATURATION: 95 % | SYSTOLIC BLOOD PRESSURE: 135 MMHG | WEIGHT: 197 LBS

## 2025-08-06 DIAGNOSIS — I25.10 CORONARY ARTERY DISEASE INVOLVING NATIVE CORONARY ARTERY OF NATIVE HEART WITHOUT ANGINA PECTORIS: ICD-10-CM

## 2025-08-06 DIAGNOSIS — E78.00 HYPERCHOLESTEREMIA: ICD-10-CM

## 2025-08-06 DIAGNOSIS — I10 ESSENTIAL HYPERTENSION: ICD-10-CM

## 2025-08-06 DIAGNOSIS — R06.02 SOB (SHORTNESS OF BREATH) ON EXERTION: ICD-10-CM

## 2025-08-06 DIAGNOSIS — R06.02 SHORTNESS OF BREATH: Primary | ICD-10-CM

## 2025-08-06 PROCEDURE — 3075F SYST BP GE 130 - 139MM HG: CPT

## 2025-08-06 PROCEDURE — 1123F ACP DISCUSS/DSCN MKR DOCD: CPT

## 2025-08-06 PROCEDURE — 99215 OFFICE O/P EST HI 40 MIN: CPT

## 2025-08-06 PROCEDURE — 1159F MED LIST DOCD IN RCRD: CPT

## 2025-08-06 PROCEDURE — 1036F TOBACCO NON-USER: CPT

## 2025-08-06 PROCEDURE — 3078F DIAST BP <80 MM HG: CPT

## 2025-08-06 PROCEDURE — 1160F RVW MEDS BY RX/DR IN RCRD: CPT

## 2025-08-06 PROCEDURE — G8427 DOCREV CUR MEDS BY ELIG CLIN: HCPCS

## 2025-08-06 PROCEDURE — G8419 CALC BMI OUT NRM PARAM NOF/U: HCPCS

## 2025-08-06 PROCEDURE — 1125F AMNT PAIN NOTED PAIN PRSNT: CPT

## 2025-08-06 RX ORDER — METOPROLOL TARTRATE 25 MG/1
25 TABLET, FILM COATED ORAL 2 TIMES DAILY
Qty: 180 TABLET | Refills: 1 | Status: SHIPPED | OUTPATIENT
Start: 2025-08-06

## 2025-08-06 RX ORDER — SIMVASTATIN 40 MG
40 TABLET ORAL NIGHTLY
Qty: 90 TABLET | Refills: 2 | Status: SHIPPED | OUTPATIENT
Start: 2025-08-06

## 2025-08-06 ASSESSMENT — PATIENT HEALTH QUESTIONNAIRE - PHQ9
SUM OF ALL RESPONSES TO PHQ QUESTIONS 1-9: 0
2. FEELING DOWN, DEPRESSED OR HOPELESS: NOT AT ALL
1. LITTLE INTEREST OR PLEASURE IN DOING THINGS: NOT AT ALL
SUM OF ALL RESPONSES TO PHQ QUESTIONS 1-9: 0

## (undated) DEVICE — BAND COMPR L24CM REG CLR PLAS HEMSTAT EXT HK AND LOOP RETEN

## (undated) DEVICE — Device

## (undated) DEVICE — CATHETER DIAG SM AD L100CM DIA6FR JUDKINS L 3.5 POLYUR COR

## (undated) DEVICE — FLUFF AND POLYMER UNDERPAD,EXTRA HEAVY: Brand: WINGS

## (undated) DEVICE — PROCEDURE KIT FLUID MGMT 10 FR CUST MAINFOLD

## (undated) DEVICE — SET FLD ADMIN 3 W STPCOCK FIX FEM L BOR 1IN

## (undated) DEVICE — FLEX ADVANTAGE 1500CC: Brand: FLEX ADVANTAGE

## (undated) DEVICE — AIRLIFE™ NASAL OXYGEN CANNULA CURVED, NONFLARED TIP WITH 14 FOOT (4.3 M) CRUSH-RESISTANT TUBING, OVER-THE-EAR STYLE: Brand: AIRLIFE™

## (undated) DEVICE — PACK PROCEDURE SURG VASC CATH 161 MMC LF

## (undated) DEVICE — COVER US PRB W15XL120CM W/ GEL RUBBERBAND TAPE STRP FLD GEN

## (undated) DEVICE — PRESSURE MONITORING SET: Brand: TRUWAVE

## (undated) DEVICE — SNARE POLYP M W27MMXL240CM OVL STIFF DISP CAPTIVATOR

## (undated) DEVICE — CATHETER ANGIO 5FR L100CM GRY S STL NYL JR4 3 SEG BRAID L

## (undated) DEVICE — TRAP SPEC COLL POLYP POLYSTYR --

## (undated) DEVICE — MEDI-VAC NON-CONDUCTIVE SUCTION TUBING: Brand: CARDINAL HEALTH

## (undated) DEVICE — GLIDESHEATH SLENDER STAINLESS STEEL KIT: Brand: GLIDESHEATH SLENDER

## (undated) DEVICE — GUIDEWIRE VASC L260CM DIA0035IN TIP L3MM PTFE J STD TAPR FIX

## (undated) DEVICE — CATH IV SAFE STR 22GX1IN BLU -- PROTECTIV PLUS